# Patient Record
Sex: MALE | Race: ASIAN | Employment: OTHER | URBAN - METROPOLITAN AREA
[De-identification: names, ages, dates, MRNs, and addresses within clinical notes are randomized per-mention and may not be internally consistent; named-entity substitution may affect disease eponyms.]

---

## 2022-10-14 ENCOUNTER — OFFICE VISIT (OUTPATIENT)
Dept: PRIMARY CARE CLINIC | Age: 72
End: 2022-10-14
Payer: MEDICAID

## 2022-10-14 VITALS
RESPIRATION RATE: 14 BRPM | SYSTOLIC BLOOD PRESSURE: 124 MMHG | OXYGEN SATURATION: 96 % | BODY MASS INDEX: 26.46 KG/M2 | HEIGHT: 64 IN | HEART RATE: 85 BPM | WEIGHT: 155 LBS | DIASTOLIC BLOOD PRESSURE: 86 MMHG

## 2022-10-14 DIAGNOSIS — F33.9 UNIPOLAR DEPRESSION (HCC): ICD-10-CM

## 2022-10-14 DIAGNOSIS — E11.69 HYPERLIPIDEMIA ASSOCIATED WITH TYPE 2 DIABETES MELLITUS (HCC): ICD-10-CM

## 2022-10-14 DIAGNOSIS — Z23 NEED FOR PNEUMOCOCCAL VACCINATION: ICD-10-CM

## 2022-10-14 DIAGNOSIS — G47.00 INSOMNIA, UNSPECIFIED TYPE: ICD-10-CM

## 2022-10-14 DIAGNOSIS — Z79.4 TYPE 2 DIABETES MELLITUS WITHOUT COMPLICATION, WITH LONG-TERM CURRENT USE OF INSULIN (HCC): ICD-10-CM

## 2022-10-14 DIAGNOSIS — Z23 NEEDS FLU SHOT: ICD-10-CM

## 2022-10-14 DIAGNOSIS — E78.5 HYPERLIPIDEMIA ASSOCIATED WITH TYPE 2 DIABETES MELLITUS (HCC): ICD-10-CM

## 2022-10-14 DIAGNOSIS — Z12.11 COLON CANCER SCREENING: ICD-10-CM

## 2022-10-14 DIAGNOSIS — E11.9 TYPE 2 DIABETES MELLITUS WITHOUT COMPLICATION, WITH LONG-TERM CURRENT USE OF INSULIN (HCC): ICD-10-CM

## 2022-10-14 DIAGNOSIS — Z76.89 ENCOUNTER TO ESTABLISH CARE: Primary | ICD-10-CM

## 2022-10-14 PROBLEM — F32.9 UNIPOLAR DEPRESSION: Status: ACTIVE | Noted: 2022-10-14

## 2022-10-14 LAB — HBA1C MFR BLD: 7 %

## 2022-10-14 PROCEDURE — 1123F ACP DISCUSS/DSCN MKR DOCD: CPT | Performed by: INTERNAL MEDICINE

## 2022-10-14 PROCEDURE — 99204 OFFICE O/P NEW MOD 45 MIN: CPT | Performed by: INTERNAL MEDICINE

## 2022-10-14 PROCEDURE — 90471 IMMUNIZATION ADMIN: CPT | Performed by: INTERNAL MEDICINE

## 2022-10-14 PROCEDURE — 83036 HEMOGLOBIN GLYCOSYLATED A1C: CPT | Performed by: INTERNAL MEDICINE

## 2022-10-14 PROCEDURE — 90694 VACC AIIV4 NO PRSRV 0.5ML IM: CPT | Performed by: INTERNAL MEDICINE

## 2022-10-14 PROCEDURE — 90472 IMMUNIZATION ADMIN EACH ADD: CPT | Performed by: INTERNAL MEDICINE

## 2022-10-14 PROCEDURE — 90677 PCV20 VACCINE IM: CPT | Performed by: INTERNAL MEDICINE

## 2022-10-14 PROCEDURE — 3051F HG A1C>EQUAL 7.0%<8.0%: CPT | Performed by: INTERNAL MEDICINE

## 2022-10-14 RX ORDER — SIMVASTATIN 20 MG
20 TABLET ORAL NIGHTLY
COMMUNITY

## 2022-10-14 RX ORDER — INSULIN ASPART 100 [IU]/ML
INJECTION, SOLUTION INTRAVENOUS; SUBCUTANEOUS
COMMUNITY
Start: 2022-10-08 | End: 2022-10-27 | Stop reason: SDUPTHER

## 2022-10-14 RX ORDER — TRAZODONE HYDROCHLORIDE 50 MG/1
50 TABLET ORAL NIGHTLY PRN
Qty: 30 TABLET | Refills: 5 | Status: SHIPPED | OUTPATIENT
Start: 2022-10-14

## 2022-10-14 RX ORDER — INSULIN GLARGINE 100 [IU]/ML
INJECTION, SOLUTION SUBCUTANEOUS NIGHTLY
COMMUNITY
End: 2022-10-21 | Stop reason: SDUPTHER

## 2022-10-14 SDOH — ECONOMIC STABILITY: FOOD INSECURITY: WITHIN THE PAST 12 MONTHS, THE FOOD YOU BOUGHT JUST DIDN'T LAST AND YOU DIDN'T HAVE MONEY TO GET MORE.: NEVER TRUE

## 2022-10-14 SDOH — ECONOMIC STABILITY: FOOD INSECURITY: WITHIN THE PAST 12 MONTHS, YOU WORRIED THAT YOUR FOOD WOULD RUN OUT BEFORE YOU GOT MONEY TO BUY MORE.: NEVER TRUE

## 2022-10-14 ASSESSMENT — PATIENT HEALTH QUESTIONNAIRE - PHQ9
SUM OF ALL RESPONSES TO PHQ QUESTIONS 1-9: 10
4. FEELING TIRED OR HAVING LITTLE ENERGY: 3
7. TROUBLE CONCENTRATING ON THINGS, SUCH AS READING THE NEWSPAPER OR WATCHING TELEVISION: 1
2. FEELING DOWN, DEPRESSED OR HOPELESS: 2
9. THOUGHTS THAT YOU WOULD BE BETTER OFF DEAD, OR OF HURTING YOURSELF: 0
3. TROUBLE FALLING OR STAYING ASLEEP: 3
5. POOR APPETITE OR OVEREATING: 0
SUM OF ALL RESPONSES TO PHQ9 QUESTIONS 1 & 2: 3
1. LITTLE INTEREST OR PLEASURE IN DOING THINGS: 1
6. FEELING BAD ABOUT YOURSELF - OR THAT YOU ARE A FAILURE OR HAVE LET YOURSELF OR YOUR FAMILY DOWN: 0
SUM OF ALL RESPONSES TO PHQ QUESTIONS 1-9: 10
10. IF YOU CHECKED OFF ANY PROBLEMS, HOW DIFFICULT HAVE THESE PROBLEMS MADE IT FOR YOU TO DO YOUR WORK, TAKE CARE OF THINGS AT HOME, OR GET ALONG WITH OTHER PEOPLE: 1
SUM OF ALL RESPONSES TO PHQ QUESTIONS 1-9: 10
SUM OF ALL RESPONSES TO PHQ QUESTIONS 1-9: 10
8. MOVING OR SPEAKING SO SLOWLY THAT OTHER PEOPLE COULD HAVE NOTICED. OR THE OPPOSITE, BEING SO FIGETY OR RESTLESS THAT YOU HAVE BEEN MOVING AROUND A LOT MORE THAN USUAL: 0

## 2022-10-14 ASSESSMENT — ENCOUNTER SYMPTOMS
CONSTIPATION: 0
COUGH: 0
SINUS PRESSURE: 0
SORE THROAT: 0
NAUSEA: 0
TROUBLE SWALLOWING: 0
VOMITING: 0
BACK PAIN: 0
WHEEZING: 0
EYE REDNESS: 0
COLOR CHANGE: 0
ABDOMINAL PAIN: 0
SHORTNESS OF BREATH: 0
CHEST TIGHTNESS: 0
BLOOD IN STOOL: 0
EYE PAIN: 0
DIARRHEA: 0
ABDOMINAL DISTENTION: 0

## 2022-10-14 ASSESSMENT — SOCIAL DETERMINANTS OF HEALTH (SDOH): HOW HARD IS IT FOR YOU TO PAY FOR THE VERY BASICS LIKE FOOD, HOUSING, MEDICAL CARE, AND HEATING?: NOT HARD AT ALL

## 2022-10-14 NOTE — ASSESSMENT & PLAN NOTE
A1C:   Lab Results   Component Value Date/Time    LABA1C 7.0 10/14/2022 08:57 AM     Stable- continue home blood sugar monitoring  Counselled on Diet and exercise with goal to achieve appropriate BMI  Continue Diabetic medication as documented in medication list Lantus, aspart, metformin 1000 mg bid  Continue home foot care  Patient agreed with plan with verbal understanding

## 2022-10-14 NOTE — ASSESSMENT & PLAN NOTE
Stable and controlled  Continue medication as documented in your medication list  Simvastatin 20 mg nightly

## 2022-10-14 NOTE — PROGRESS NOTES
Heber Saucedo (:  1950) is a 81 y.o. male,New patient, here for evaluation of the following chief complaint(s):  Diabetes      SUBJECTIVE/OBJECTIVE:  HPI    This is a 81 y.o. male patient who comes in for establishment of care. The patient has a past medical history of -    1. T2 DM - Patient's blood sugars have been stable. Patient denies complaints or symptoms today no polyuria or polydipsia. Patient mentions adherence with treatment. Patient eats a low-carb diet. Patient denies chest pain or shortness of breath with exertion or at rest.       2. HLD - Patient denies complaints or symptoms today. Patient mentions he is adherent with treatment. 3. Depression/Insomnia - Patient complaining of the same. Patient recently moved to [place] is feeling a little bit sad. Patient says that his prior doctor prescribed trazodone which helped with both insomnia and improved his  mood. Requesting prescription of the same. Health Maintenance    Annual retinal eye exam - Patient due  will need to schedule   Annual foot exam - will get in future  Annual Dentist visit - Patient due  will need to schedule   Tobacco smoking - NO  Alcohol Misuse - NO  Illicit Drug Use-  NO  Healthy Diet and physical activity -  YES  Obesity Screen- screened 10/14/22  Wears seat belt- YES  End of life directives discussed with patient. -Mentions he does not have  a will/or/an advanced directives. Was instructed to start process looking into preparing his will an advanced directives.    The ASCVD Risk score (Holly GREENE, et al., 2019) failed to calculate for the following reasons:    Cannot find a previous HDL lab    Cannot find a previous total cholesterol lab     Health Maintenance Due   Topic Date Due    COVID-19 Vaccine (1) Never done    Lipids  Never done    Depression Monitoring  Never done    Hepatitis C screen  Never done    DTaP/Tdap/Td vaccine (1 - Tdap) Never done    Colorectal Cancer Screen  Never done    Shingles vaccine (1 of 2) Never done    Pneumococcal 65+ years Vaccine (1 - PCV) Never done    Flu vaccine (1) Never done      Social History     Socioeconomic History    Marital status:      Spouse name: Not on file    Number of children: Not on file    Years of education: Not on file    Highest education level: Not on file   Occupational History    Not on file   Tobacco Use    Smoking status: Never    Smokeless tobacco: Never   Substance and Sexual Activity    Alcohol use: Not on file    Drug use: Not on file    Sexual activity: Not on file   Other Topics Concern    Not on file   Social History Narrative    Not on file     Social Determinants of Health     Financial Resource Strain: Low Risk     Difficulty of Paying Living Expenses: Not hard at all   Food Insecurity: No Food Insecurity    Worried About Running Out of Food in the Last Year: Never true    Sumeet of Food in the Last Year: Never true   Transportation Needs: Not on file   Physical Activity: Not on file   Stress: Not on file   Social Connections: Not on file   Intimate Partner Violence: Not on file   Housing Stability: Not on file      Past Medical History:   Diagnosis Date    Depression     Diabetes mellitus ([facility] [address]       No past surgical history on file. Current Outpatient Medications   Medication Sig Dispense Refill    Insulin Aspart FlexPen 100 UNIT/ML SOPN ADMINISTER 5 UNITS UNDER THE SKIN THREE TIMES DAILY BEFORE MEALS      traZODone (DESYREL) 50 MG tablet Take 1 tablet by mouth nightly as needed for Sleep 30 tablet 5    metFORMIN (GLUCOPHAGE) 1000 MG tablet Take 1,000 mg by mouth 2 times daily (with meals)      simvastatin (ZOCOR) 20 MG tablet Take 20 mg by mouth nightly      insulin glargine (LANTUS) 100 UNIT/ML injection vial Inject into the skin nightly       No current facility-administered medications for this visit.       No Known Allergies     Review of Systems   Constitutional:  Negative for activity change, appetite change, chills, fatigue, fever and unexpected weight change. HENT:  Negative for congestion, ear pain, postnasal drip, sinus pressure, sore throat, tinnitus and trouble swallowing. Eyes:  Negative for pain and redness. Respiratory:  Negative for cough, chest tightness, shortness of breath and wheezing. Cardiovascular:  Negative for chest pain, palpitations and leg swelling. Gastrointestinal:  Negative for abdominal distention, abdominal pain, blood in stool, constipation, diarrhea, nausea and vomiting. Endocrine: Negative for cold intolerance, heat intolerance and polydipsia. Genitourinary:  Negative for decreased urine volume, dysuria, flank pain, frequency, hematuria and urgency. Musculoskeletal:  Negative for arthralgias, back pain, joint swelling, neck pain and neck stiffness. Skin:  Negative for color change and rash. Neurological:  Negative for dizziness, weakness, numbness and headaches. Hematological:  Negative for adenopathy. Psychiatric/Behavioral:  Negative for behavioral problems, sleep disturbance and suicidal ideas. The patient is not nervous/anxious. /86 (Site: Left Upper Arm, Position: Sitting, Cuff Size: Medium Adult)   Pulse 85   Resp 14   Ht 5' 3.5\" (1.613 m)   Wt 155 lb (70.3 kg)   SpO2 96%   BMI 27.03 kg/m²    Physical Exam  Constitutional:       General: He is not in acute distress. Appearance: Normal appearance. He is not ill-appearing. HENT:      Head: Normocephalic and atraumatic. Right Ear: Tympanic membrane, ear canal and external ear normal. There is no impacted cerumen. Left Ear: Tympanic membrane, ear canal and external ear normal. There is no impacted cerumen. Mouth/Throat:      Mouth: Mucous membranes are moist.      Pharynx: No oropharyngeal exudate or posterior oropharyngeal erythema. Eyes:      Extraocular Movements: Extraocular movements intact.       Conjunctiva/sclera: Conjunctivae normal.      Pupils: Pupils are equal, round, and reactive to light. Neck:      Vascular: No carotid bruit. Cardiovascular:      Rate and Rhythm: Normal rate and regular rhythm. Pulses: Normal pulses. Heart sounds: Normal heart sounds. No murmur heard. No gallop. Pulmonary:      Effort: Pulmonary effort is normal.      Breath sounds: Normal breath sounds. No wheezing, rhonchi or rales. Abdominal:      General: Abdomen is flat. Bowel sounds are normal. There is no distension. Palpations: Abdomen is soft. Tenderness: There is no abdominal tenderness. There is no guarding or rebound. Musculoskeletal:         General: No swelling or tenderness. Normal range of motion. Cervical back: No tenderness. Lymphadenopathy:      Cervical: No cervical adenopathy. Skin:     Findings: No erythema, lesion or rash. Neurological:      General: No focal deficit present. Mental Status: He is alert and oriented to person, place, and time. Mental status is at baseline. Cranial Nerves: No cranial nerve deficit. Motor: No weakness. Psychiatric:         Mood and Affect: Mood normal.         Behavior: Behavior normal.         Thought Content: Thought content normal.         Judgment: Judgment normal.       ASSESSMENT/PLAN:  1. Encounter to establish care  Assessment & Plan:   Patient comes in to establish care. We discussed age appropriate USPSTF screens  Over 75% of the visit was spent counselling patient on appropriate lifestyle choices. See below for other diagnoses     2.  Type 2 diabetes mellitus without complication, with long-term current use of insulin ([facility])  Assessment & Plan:     A1C:   Lab Results   Component Value Date/Time    LABA1C 7.0 10/14/2022 08:57 AM     Stable- continue home blood sugar monitoring  Counselled on Diet and exercise with goal to achieve appropriate BMI  Continue Diabetic medication as documented in medication list Lantus, aspart, metformin 1000 mg bid  Continue home foot care  Patient agreed with plan with verbal understanding     Orders:  -     POCT glycosylated hemoglobin (Hb A1C)  3. Hyperlipidemia associated with type 2 diabetes mellitus ([facility] [address]  Assessment & Plan:  Stable and controlled  Continue medication as documented in your medication list  Simvastatin 20 mg nightly  4. Unipolar depression [facility])  Assessment & Plan: We will restart trazodone 50 mg at night  Orders:  -     traZODone (DESYREL) 50 MG tablet; Take 1 tablet by mouth nightly as needed for Sleep, Disp-30 tablet, R-5Normal  5. Insomnia, unspecified type  -     traZODone (DESYREL) 50 MG tablet; Take 1 tablet by mouth nightly as needed for Sleep, Disp-30 tablet, R-5Normal  6. Colon cancer screening  -     POCT Fecal Immunochemical Test (FIT); Future  7. Needs flu shot  -     Influenza, FLUAD, (age 72 y+), IM, PF, 0.5 mL  8. Need for pneumococcal vaccination  -     Pneumococcal, PCV20, PREVNAR 21, (age 25 yrs+), IM, PF      Preventative care discussed. Encouraged healthy diet choices, reg exercise. Patient agreed w/plan and verbal understanding. Patient advised to call or return with any concerns or problems or worsening conditions. Go to the ER for any severe or potentially life threatening problems. Return in about 5 days (around 10/19/2022). This note was generated in part or in whole with voice recognition software. Voice recognition is usually quite accurate but there are transcription errors that can often occur. All attempts were made to correct these errors I apologized for any  typographical errors that were not detected and corrected. Electronically signed by Amna Lopez MD on 10/14/2022 at 9:23 AM.

## 2022-10-21 ENCOUNTER — TELEPHONE (OUTPATIENT)
Dept: PRIMARY CARE CLINIC | Age: 72
End: 2022-10-21

## 2022-10-21 DIAGNOSIS — E11.9 TYPE 2 DIABETES MELLITUS WITHOUT COMPLICATION, WITH LONG-TERM CURRENT USE OF INSULIN (HCC): Primary | ICD-10-CM

## 2022-10-21 DIAGNOSIS — I10 BENIGN ESSENTIAL HTN: ICD-10-CM

## 2022-10-21 DIAGNOSIS — Z79.4 TYPE 2 DIABETES MELLITUS WITHOUT COMPLICATION, WITH LONG-TERM CURRENT USE OF INSULIN (HCC): Primary | ICD-10-CM

## 2022-10-21 RX ORDER — LISINOPRIL 5 MG/1
5 TABLET ORAL DAILY
Qty: 30 TABLET | Refills: 5 | Status: SHIPPED | OUTPATIENT
Start: 2022-10-21

## 2022-10-21 RX ORDER — INSULIN GLARGINE 100 [IU]/ML
25 INJECTION, SOLUTION SUBCUTANEOUS NIGHTLY
Qty: 10 ML | Refills: 5 | Status: SHIPPED | OUTPATIENT
Start: 2022-10-21 | End: 2022-10-26

## 2022-10-21 NOTE — TELEPHONE ENCOUNTER
Delfino called and said that his father Norma Loja is out of his insulin glargine 100 units and his lisinopril 5 mg. Please call to advise.

## 2022-10-26 ENCOUNTER — OFFICE VISIT (OUTPATIENT)
Dept: PRIMARY CARE CLINIC | Age: 72
End: 2022-10-26
Payer: MEDICAID

## 2022-10-26 VITALS
SYSTOLIC BLOOD PRESSURE: 113 MMHG | BODY MASS INDEX: 26.46 KG/M2 | RESPIRATION RATE: 16 BRPM | WEIGHT: 155 LBS | HEIGHT: 64 IN | DIASTOLIC BLOOD PRESSURE: 88 MMHG | OXYGEN SATURATION: 98 % | HEART RATE: 114 BPM

## 2022-10-26 DIAGNOSIS — Z72.89 OTHER PROBLEMS RELATED TO LIFESTYLE: ICD-10-CM

## 2022-10-26 DIAGNOSIS — Z79.4 TYPE 2 DIABETES MELLITUS WITHOUT COMPLICATION, WITH LONG-TERM CURRENT USE OF INSULIN (HCC): ICD-10-CM

## 2022-10-26 DIAGNOSIS — Z23 NEED FOR TDAP VACCINATION: ICD-10-CM

## 2022-10-26 DIAGNOSIS — E78.5 HYPERLIPIDEMIA ASSOCIATED WITH TYPE 2 DIABETES MELLITUS (HCC): ICD-10-CM

## 2022-10-26 DIAGNOSIS — Z23 NEED FOR SHINGLES VACCINE: ICD-10-CM

## 2022-10-26 DIAGNOSIS — R53.83 OTHER FATIGUE: ICD-10-CM

## 2022-10-26 DIAGNOSIS — Z12.5 SCREENING PSA (PROSTATE SPECIFIC ANTIGEN): ICD-10-CM

## 2022-10-26 DIAGNOSIS — F33.9 UNIPOLAR DEPRESSION (HCC): ICD-10-CM

## 2022-10-26 DIAGNOSIS — Z13.220 SCREENING FOR HYPERLIPIDEMIA: ICD-10-CM

## 2022-10-26 DIAGNOSIS — E55.9 VITAMIN D DEFICIENCY: ICD-10-CM

## 2022-10-26 DIAGNOSIS — Z00.00 ENCOUNTER FOR WELL ADULT EXAM WITHOUT ABNORMAL FINDINGS: Primary | ICD-10-CM

## 2022-10-26 DIAGNOSIS — E11.69 HYPERLIPIDEMIA ASSOCIATED WITH TYPE 2 DIABETES MELLITUS (HCC): ICD-10-CM

## 2022-10-26 DIAGNOSIS — E11.9 TYPE 2 DIABETES MELLITUS WITHOUT COMPLICATION, WITH LONG-TERM CURRENT USE OF INSULIN (HCC): ICD-10-CM

## 2022-10-26 DIAGNOSIS — Z11.59 NEED FOR HEPATITIS C SCREENING TEST: ICD-10-CM

## 2022-10-26 DIAGNOSIS — G47.00 INSOMNIA, UNSPECIFIED TYPE: ICD-10-CM

## 2022-10-26 DIAGNOSIS — Z00.00 ENCOUNTER FOR WELL ADULT EXAM WITHOUT ABNORMAL FINDINGS: ICD-10-CM

## 2022-10-26 DIAGNOSIS — E11.9 ENCOUNTER FOR DIABETIC FOOT EXAM (HCC): ICD-10-CM

## 2022-10-26 DIAGNOSIS — Z12.11 COLON CANCER SCREENING: ICD-10-CM

## 2022-10-26 LAB
A/G RATIO: 1.5 (ref 1.1–2.2)
ALBUMIN SERPL-MCNC: 4.3 G/DL (ref 3.4–5)
ALP BLD-CCNC: 92 U/L (ref 40–129)
ALT SERPL-CCNC: 22 U/L (ref 10–40)
ANION GAP SERPL CALCULATED.3IONS-SCNC: 13 MMOL/L (ref 3–16)
AST SERPL-CCNC: 20 U/L (ref 15–37)
BASOPHILS ABSOLUTE: 0 K/UL (ref 0–0.2)
BASOPHILS RELATIVE PERCENT: 0.6 %
BILIRUB SERPL-MCNC: 0.7 MG/DL (ref 0–1)
BUN BLDV-MCNC: 11 MG/DL (ref 7–20)
CALCIUM SERPL-MCNC: 9.3 MG/DL (ref 8.3–10.6)
CHLORIDE BLD-SCNC: 102 MMOL/L (ref 99–110)
CHOLESTEROL, TOTAL: 118 MG/DL (ref 0–199)
CO2: 24 MMOL/L (ref 21–32)
CREAT SERPL-MCNC: 0.8 MG/DL (ref 0.8–1.3)
EOSINOPHILS ABSOLUTE: 0.4 K/UL (ref 0–0.6)
EOSINOPHILS RELATIVE PERCENT: 5 %
GFR SERPL CREATININE-BSD FRML MDRD: >60 ML/MIN/{1.73_M2}
GLUCOSE BLD-MCNC: 168 MG/DL (ref 70–99)
HCT VFR BLD CALC: 48.2 % (ref 40.5–52.5)
HDLC SERPL-MCNC: 45 MG/DL (ref 40–60)
HEMOGLOBIN: 15.7 G/DL (ref 13.5–17.5)
HEPATITIS C ANTIBODY INTERPRETATION: NORMAL
LDL CHOLESTEROL CALCULATED: 53 MG/DL
LYMPHOCYTES ABSOLUTE: 1.4 K/UL (ref 1–5.1)
LYMPHOCYTES RELATIVE PERCENT: 18.6 %
MCH RBC QN AUTO: 28.5 PG (ref 26–34)
MCHC RBC AUTO-ENTMCNC: 32.6 G/DL (ref 31–36)
MCV RBC AUTO: 87.6 FL (ref 80–100)
MONOCYTES ABSOLUTE: 0.5 K/UL (ref 0–1.3)
MONOCYTES RELATIVE PERCENT: 6.6 %
NEUTROPHILS ABSOLUTE: 5.3 K/UL (ref 1.7–7.7)
NEUTROPHILS RELATIVE PERCENT: 69.2 %
PDW BLD-RTO: 14.2 % (ref 12.4–15.4)
PLATELET # BLD: 244 K/UL (ref 135–450)
PMV BLD AUTO: 8.2 FL (ref 5–10.5)
POTASSIUM SERPL-SCNC: 4.2 MMOL/L (ref 3.5–5.1)
PROSTATE SPECIFIC ANTIGEN: 1.53 NG/ML (ref 0–4)
RBC # BLD: 5.5 M/UL (ref 4.2–5.9)
SODIUM BLD-SCNC: 139 MMOL/L (ref 136–145)
TOTAL PROTEIN: 7.2 G/DL (ref 6.4–8.2)
TRIGL SERPL-MCNC: 100 MG/DL (ref 0–150)
TSH REFLEX: 1.32 UIU/ML (ref 0.27–4.2)
VITAMIN D 25-HYDROXY: 34.5 NG/ML
VLDLC SERPL CALC-MCNC: 20 MG/DL
WBC # BLD: 7.7 K/UL (ref 4–11)

## 2022-10-26 PROCEDURE — 90750 HZV VACC RECOMBINANT IM: CPT | Performed by: INTERNAL MEDICINE

## 2022-10-26 PROCEDURE — 90471 IMMUNIZATION ADMIN: CPT | Performed by: INTERNAL MEDICINE

## 2022-10-26 PROCEDURE — 99397 PER PM REEVAL EST PAT 65+ YR: CPT | Performed by: INTERNAL MEDICINE

## 2022-10-26 RX ORDER — INSULIN GLARGINE 100 [IU]/ML
25 INJECTION, SOLUTION SUBCUTANEOUS NIGHTLY
Qty: 5 ADJUSTABLE DOSE PRE-FILLED PEN SYRINGE | Refills: 5 | Status: SHIPPED | OUTPATIENT
Start: 2022-10-26

## 2022-10-26 ASSESSMENT — ENCOUNTER SYMPTOMS
VOMITING: 0
SINUS PRESSURE: 0
CONSTIPATION: 0
ABDOMINAL PAIN: 0
BLOOD IN STOOL: 0
EYE REDNESS: 0
TROUBLE SWALLOWING: 0
COLOR CHANGE: 0
BACK PAIN: 0
NAUSEA: 0
WHEEZING: 0
ABDOMINAL DISTENTION: 0
CHEST TIGHTNESS: 0
COUGH: 0
DIARRHEA: 0
SHORTNESS OF BREATH: 0
SORE THROAT: 0
EYE PAIN: 0

## 2022-10-26 NOTE — PROGRESS NOTES
Well Adult Note  Name: Rosalba Mak Date: 10/26/2022   MRN: [id number] Sex: Male   Age: 81 y.o. Ethnicity: Non- / Non    : 1950 Race:       Nikkie Garber is here for well adult exam.  History:  Wellness exam - Patient presents today for annual physical. Patient  reports feeling well. Patient has a normal appetite. Patient  eats 5+ servings of vegetables/fruits each day. Patient prepares food at home multiple times/day, eats in restaurants rarely. Patient  states that he sleeps well and gets 7-8 hours of sleep on average. In regards to emotional health, patient  denies depression or anxiety. Libido is considered to be normal. In regards to bowel habits, patient  has no complaints. Regarding urination, no complaints. Patient reports they feels safe at home, uses seat belts and has smoke detectors. Health Maintenance    Annual retinal eye exam - Patient due  will need to schedule   Annual foot exam - will get in future  Annual Dentist visit - Patient due  will need to schedule   Tobacco smoking - NO  Alcohol Misuse - NO  Illicit Drug Use-  NO  Healthy Diet and physical activity -  YES  Obesity Screen- screened 10/14/22  Wears seat belt- YES  End of life directives discussed with patient. -Mentions he does not have  a will/or/an advanced directives. Was instructed to start process looking into preparing his will an advanced directives. Review of Systems   Constitutional:  Negative for activity change, appetite change, chills, fatigue, fever and unexpected weight change. HENT:  Negative for congestion, ear pain, postnasal drip, sinus pressure, sore throat, tinnitus and trouble swallowing. Eyes:  Negative for pain and redness. Respiratory:  Negative for cough, chest tightness, shortness of breath and wheezing. Cardiovascular:  Negative for chest pain, palpitations and leg swelling.    Gastrointestinal:  Negative for abdominal distention, abdominal pain, blood in stool, constipation, diarrhea, nausea and vomiting. Endocrine: Negative for cold intolerance, heat intolerance and polydipsia. Genitourinary:  Negative for decreased urine volume, dysuria, flank pain, frequency, hematuria and urgency. Musculoskeletal:  Negative for arthralgias, back pain, joint swelling, neck pain and neck stiffness. Skin:  Negative for color change and rash. Neurological:  Negative for dizziness, weakness, numbness and headaches. Hematological:  Negative for adenopathy. Psychiatric/Behavioral:  Negative for behavioral problems, sleep disturbance and suicidal ideas. The patient is not nervous/anxious. No Known Allergies      Prior to Visit Medications    Medication Sig Taking? Authorizing Provider   insulin glargine (LANTUS SOLOSTAR) 100 UNIT/ML injection pen Inject 25 Units into the skin nightly Yes Jhoana Dolan MD   Insulin Pen Needle 32G X 4 MM MISC 1 each by Does not apply route daily Compatible with lantus Solostar pen Yes Jhoana Dolan MD   lisinopril (PRINIVIL;ZESTRIL) 5 MG tablet Take 1 tablet by mouth daily Yes Jhoana Dolan MD   Insulin Aspart FlexPen 100 UNIT/ML SOPN ADMINISTER 5 UNITS UNDER THE SKIN THREE TIMES DAILY BEFORE MEALS Yes Historical Provider, MD   traZODone (DESYREL) 50 MG tablet Take 1 tablet by mouth nightly as needed for Sleep Yes Jhoana Dolan MD   metFORMIN (GLUCOPHAGE) 1000 MG tablet Take 1,000 mg by mouth 2 times daily (with meals) Yes Historical Provider, MD   simvastatin (ZOCOR) 20 MG tablet Take 20 mg by mouth nightly Yes Historical Provider, MD         Past Medical History:   Diagnosis Date    Depression     Diabetes mellitus ([facility] [address]        No past surgical history on file. No family history on file.     Social History     Tobacco Use    Smoking status: Never    Smokeless tobacco: Never       Objective   /88 (Site: Left Upper Arm, Position: Sitting, Cuff Size: Medium Adult)   Pulse (!) 114   Resp 16   Ht 5' 3.5\" (1.613 m)   Wt 155 lb (70.3 kg) SpO2 98%   BMI 27.03 kg/m²   Wt Readings from Last 3 Encounters:   10/26/22 155 lb (70.3 kg)   10/14/22 155 lb (70.3 kg)     There were no vitals filed for this visit. Physical Exam  Constitutional:       General: He is not in acute distress. Appearance: Normal appearance. He is not ill-appearing. HENT:      Head: Normocephalic and atraumatic. Right Ear: Tympanic membrane, ear canal and external ear normal. There is no impacted cerumen. Left Ear: Tympanic membrane, ear canal and external ear normal. There is no impacted cerumen. Mouth/Throat:      Mouth: Mucous membranes are moist.      Pharynx: No oropharyngeal exudate or posterior oropharyngeal erythema. Eyes:      Extraocular Movements: Extraocular movements intact. Conjunctiva/sclera: Conjunctivae normal.      Pupils: Pupils are equal, round, and reactive to light. Neck:      Vascular: No carotid bruit. Cardiovascular:      Rate and Rhythm: Normal rate and regular rhythm. Pulses:           Dorsalis pedis pulses are detected w/ Doppler on the right side and detected w/ Doppler on the left side. Posterior tibial pulses are detected w/ Doppler on the right side and detected w/ Doppler on the left side. Heart sounds: Normal heart sounds. No murmur heard. No gallop. Pulmonary:      Effort: Pulmonary effort is normal.      Breath sounds: Normal breath sounds. No wheezing, rhonchi or rales. Abdominal:      General: Abdomen is flat. Bowel sounds are normal. There is no distension. Palpations: Abdomen is soft. Tenderness: There is no abdominal tenderness. There is no guarding or rebound. Musculoskeletal:         General: No swelling or tenderness. Normal range of motion. Cervical back: No tenderness. Feet:      Right foot:      Protective Sensation: 10 sites tested. 10 sites sensed. Left foot:      Protective Sensation: 10 sites tested. 10 sites sensed.       Comments: Diabetic foot exam - Vibratory sensation using 128 Hz tuning fork intact for bilateral - lateral ankle malleoli,  and bilateral 1st digit PIP joints. Lymphadenopathy:      Cervical: No cervical adenopathy. Skin:     Findings: No erythema, lesion or rash. Neurological:      General: No focal deficit present. Mental Status: He is alert and oriented to person, place, and time. Mental status is at baseline. Cranial Nerves: No cranial nerve deficit. Motor: No weakness. Psychiatric:         Mood and Affect: Mood normal.         Behavior: Behavior normal.         Thought Content: Thought content normal.         Judgment: Judgment normal.         Assessment   Plan   1. Encounter for well adult exam without abnormal findings  Assessment & Plan:   Patient comes in for wellness exam   we discussed age appropriate USPSTF screens  Over 75% of the visit was spent counselling patient on appropriate lifestyle choices. Orders:  -     Lipid Panel; Future  -     Hepatitis C Antibody; Future  -     CBC with Auto Differential; Future  -     Comprehensive Metabolic Panel; Future  -     Hemoglobin A1C; Future  -     Vitamin D 25 Hydroxy; Future  -     TSH with Reflex; Future  -     PSA Screening; Future  -     Microalbumin / Creatinine Urine Ratio; Future  2. Type 2 diabetes mellitus without complication, with long-term current use of insulin ([facility])  Assessment & Plan:     A1C:   Lab Results   Component Value Date/Time    LABA1C 7.0 10/14/2022 08:57 AM     Stable- continue home blood sugar monitoring  Counselled on Diet and exercise with goal to achieve appropriate BMI  Continue Diabetic medication as documented in medication list Lantus 25 units nightly, aspart, metformin 1000 mg bid  Continue home foot care  Patient agreed with plan with verbal understanding     Orders:  -     CBC with Auto Differential; Future  -     Comprehensive Metabolic Panel;  Future  -     Hemoglobin A1C; Future  -     Microalbumin / Creatinine Urine Ratio; Future  -     insulin glargine (LANTUS SOLOSTAR) 100 UNIT/ML injection pen; Inject 25 Units into the skin nightly, Disp-5 Adjustable Dose Pre-filled Pen Syringe, R-5Normal  -     Insulin Pen Needle 32G X 4 MM MISC; DAILY Starting Wed 10/26/2022, Disp-100 each, R-4, NormalCompatible with lantus Solostar pen  3. Hyperlipidemia associated with type 2 diabetes mellitus ([facility]  Assessment & Plan:  Stable and controlled  Continue medication as documented in your medication list  Simvastatin 20 mg nightly  4. Unipolar depression [facility])  Assessment & Plan: We will restart trazodone 50 mg at night  5. Insomnia, unspecified type  Assessment & Plan:   Stable and controlled  Continue medication as documented in your medication list  Trazodone 50 mg nightly  6. Encounter for diabetic foot exam ([facility]  Assessment & Plan:   Performed today  Orders:  -      DIABETES FOOT EXAM  7. Need for Tdap vaccination  -     TSH with Reflex; Future  8. Need for shingles vaccine  -     Zoster, SHINGRIX, (50 yrs +), IM  9. Screening for hyperlipidemia  -     Lipid Panel; Future  10. Other problems related to lifestyle  -     Hepatitis C Antibody; Future  11. Need for hepatitis C screening test  -     Hepatitis C Antibody; Future  12. Other fatigue  13. Vitamin D deficiency  -     Vitamin D 25 Hydroxy; Future  14. Screening PSA (prostate specific antigen)  -     PSA Screening; Future  15. Colon cancer screening  -     POCT Fecal Immunochemical Test (FIT); Future       Well adult exam  -  Anticipatory Guidance  Injury Prevention  Lap-shoulder belts, Smoke detectors, Carbon monoxide detectors, Safe storage and handling of firearms; removal if appropriate and  Occupational risk counseling  Substance Abuse  - Tobacco/alcohol/drug cessation or never starting any of those. Include pharmacotherapy, social support for cessation if applicable to patient, and skills training/problem solving. Availability of treatment for abuse.     Sexual Behavior  - STD prevention; abstinence; avoid high-risk behavior; condoms/female barrier with spermicide,  Contraception   Diet and Exercise   - Limit fat and cholesterol; maintain caloric balance; emphasized grains, fruits and vegetables. Moderation in sodium/caffeine intake, saturated fat and cholesterol, caloric balance, sufficient intake of fresh fruits, vegetables, fiber, calcium. Regular physical activity at least 150 minutes per week to maintain activity. Stressed the importance of regular exercise. Protection from UV Light: Discussed using Hats and sun block when exposed to direct sunlight. To schedule general skin exam with Dermatology. Abuse and Violence: violence prevention at home, school and in social situations  Dental Health: Discussed importance of regular tooth brushing, flossing, and dental visits.   Immunizations reviewed : Discussed with patient      Personalized Preventive Plan   Current Health Maintenance Status  Immunization History   Administered Date(s) Administered    Influenza, FLUAD, (age 72 y+), Adjuvanted, 0.5mL 10/14/2022    Pneumococcal conjugate PCV20, PF (Prevnar 20) 10/14/2022    Zoster Recombinant (Shingrix) 10/26/2022        Health Maintenance   Topic Date Due    COVID-19 Vaccine (1) Never done    Lipids  Never done    Diabetic microalbuminuria test  Never done    Diabetic retinal exam  Never done    Hepatitis C screen  Never done    DTaP/Tdap/Td vaccine (1 - Tdap) Never done    Colorectal Cancer Screen  Never done    Shingles vaccine (2 of 2) 12/21/2022    A1C test (Diabetic or Prediabetic)  10/14/2023    Depression Monitoring  10/14/2023    Diabetic foot exam  10/26/2023    Flu vaccine  Completed    Pneumococcal 65+ years Vaccine  Completed    Hepatitis A vaccine  Aged Out    Hib vaccine  Aged Out    Meningococcal (ACWY) vaccine  Aged Out     Recommendations for [place] Due: see orders and patient instructions/AVS.    Return in about 6 months (around 4/26/2023). Advance Care Planning   Advanced Care Planning: Discussed the patients choices for care and treatment in case of a health event that adversely affects decision-making abilities. Also discussed the patients long-term treatment options. Reviewed with the patient the appropriate state-specific advance directive documents. Reviewed the process of designating a competent adult as an Agent (or -in-fact) that could take make health care decisions for the patient if incompetent. Patient was asked to complete the declaration forms, either acknowledge the forms by a public notary or an eligible witness and provide a signed copy to the practice office.   Time spent (minutes): 5 mins

## 2022-10-26 NOTE — ASSESSMENT & PLAN NOTE
A1C:   Lab Results   Component Value Date/Time    LABA1C 7.0 10/14/2022 08:57 AM     Stable- continue home blood sugar monitoring  Counselled on Diet and exercise with goal to achieve appropriate BMI  Continue Diabetic medication as documented in medication list Lantus 25 units nightly, aspart, metformin 1000 mg bid  Continue home foot care  Patient agreed with plan with verbal understanding

## 2022-10-26 NOTE — PATIENT INSTRUCTIONS
Advance Directives: Care Instructions  Overview  An advance directive is a legal way to state your wishes at the end of your life. It tells your family and your doctor what to do if you can't say what you want. There are two main types of advance directives. You can change them any time your wishes change. Living will. This form tells your family and your doctor your wishes about life support and other treatment. The form is also called a declaration. Medical power of . This form lets you name a person to make treatment decisions for you when you can't speak for yourself. This person is called a health care agent (health care proxy, health care surrogate). The form is also called a durable power of  for health care. If you do not have an advance directive, decisions about your medical care may be made by a family member, or by a doctor or a  who doesn't know you. It may help to think of an advance directive as a gift to the people who care for you. If you have one, they won't have to make tough decisions by themselves. Follow-up care is a key part of your treatment and safety. Be sure to make and go to all appointments, and call your doctor if you are having problems. It's also a good idea to know your test results and keep a list of the medicines you take. What should you include in an advance directive? Many states have a unique advance directive form. (It may ask you to address specific issues.) Or you might use a universal form that's approved by many states. If your form doesn't tell you what to address, it may be hard to know what to include in your advance directive. Use the questions below to help you get started. Who do you want to make decisions about your medical care if you are not able to? What life-support measures do you want if you have a serious illness that gets worse over time or can't be cured? What are you most afraid of that might happen?  (Maybe you're afraid of having pain, losing your independence, or being kept alive by machines.)  Where would you prefer to die? (Your home? A hospital? A nursing home?)  Do you want to donate your organs when you die? Do you want certain Catholic practices performed before you die? When should you call for help? Be sure to contact your doctor if you have any questions. Where can you learn more? Go to [email or web address]://[email or web address] and sign in to your JobSlot account. Enter R264 in the [employer] box to learn more about \"Advance Directives: Care Instructions. \"     If you do not have an account, please click on the \"Sign Up Now\" link. Current as of: June 16, 2022               Content Version: 13.4  © 8441-4718 [employer]. Care instructions adapted under license by [facility] [place] ([facility]). If you have questions about a medical condition or this instruction, always ask your healthcare professional. [place] [address] any warranty or liability for your use of this information. Learning About Medical Power of   What is a medical power of ? A medical power of , also called a durable power of  for health care, is one type of the legal forms called advance directives. It lets you name the person you want to make treatment decisions for you if you can't speak or decide for yourself. The person you choose is called your health care agent. This person is also called a health care proxy or health care surrogate. A medical power of  may be called something else in your state. How do you choose a health care agent? Choose your health care agent carefully. This person may or may not be a family member. Talk to the person before you make your final decision. Make sure he or she is comfortable with this responsibility. It's a good idea to choose someone who:  Is at least 25years old.   Knows you well and understands what makes life meaningful for you. Understands your Caodaism and moral values. Will do what you want, not what he or she wants. Will be able to make difficult choices at a stressful time. Will be able to refuse or stop treatment, if that is what you would want, even if you could die. Will be firm and confident with health professionals if needed. Will ask questions to get needed information. Lives near you or agrees to travel to you if needed. Your family may help you make medical decisions while you can still be part of that process. But it's important to choose one person to be your health care agent in case you aren't able to make decisions for yourself. If you don't fill out the legal form and name a health care agent, the decisions your family can make may be limited. A health care agent may be called something else in your state. Who will make decisions for you if you don't have a health care agent? If you don't have a health care agent or a living will, you may not get the care you want. Decisions may be made by family members who disagree about your medical care. Or decisions may be made by a medical professional who doesn't know you well. In some cases, a  makes the decisions. When you name a health care agent, it is very clear who has the power to make health decisions for you. How do you name a health care agent? You name your health care agent on a legal form. This form is usually called a medical power of . Ask your hospital, state bar association, or office on aging where to find these forms. You must sign the form to make it legal. Some states require you to get the form notarized. This means that a person called a  watches you sign the form and then he or she signs the form. Some states also require that two or more witnesses sign the form. Be sure to tell your family members and doctors who your health care agent is. Where can you learn more?   Go to [email or web address] and sign in to your Yotomo account. Enter [id number] in the [facility] box to learn more about \"Learning About [address]. \"     If you do not have an account, please click on the \"Sign Up Now\" link. Current as of: October 6, 2021               Content Version: 13.4  © 2006-2022 Healthwise, [employer]. Care instructions adapted under license by [facility] [place] ([facility]). If you have questions about a medical condition or this instruction, always ask your healthcare professional. [address] any warranty or liability for your use of this information. Learning About Morgan Jackson  What is a living will? A living will, also called a declaration, is a legal form. It tells your family and your doctor your wishes when you can't speak for yourself. It's used by the health professionals who will treat you as you near the end of your life or if you get seriously hurt or ill. If you put your wishes in writing, your loved ones and others will know what kind of care you want. They won't need to guess. This can ease your mind and be helpful to others. And you can change or cancel your living will at any time. A living will is not the same as an estate or property will. An estate will explains what you want to happen with your money and property after you die. How do you use it? Keep these facts in mind about how a living will is used. Your living will is used only if you can't speak or make decisions for yourself. Most often, one or more doctors must certify that you can't speak or decide for yourself before your living will takes effect. If you get better and can speak for yourself again, you can accept or refuse any treatment. It doesn't matter what you said in your living will. Some states may limit your right to refuse treatment in certain cases.  For example, you may need to clearly state in your living will that you don't want artificial hydration and nutrition, such as being fed through a tube. Is a living will a legal document? A living will is a legal document. Each state has its own laws about living robles. And a living will may be called something else in your state. Here are some things to know about living robles. You don't need an  to complete a living will. But legal advice can be helpful if your state's laws are unclear. It can also help if your health history is complicated or your family can't agree on what should be in your living will. You can change your living will at any time. Some people find that their wishes about end-of-life care change as their health changes. If you make big changes to your living will, complete a new form. If you move to another state, make sure that your living will is legal in the state where you now live. In most cases, doctors will respect your wishes even if you have a form from a different state. You might use a universal form that has been approved by many states. This kind of form can sometimes be filled out and stored online. Your digital copy will then be available wherever you have a connection to the internet. The doctors and nurses who need to treat you can find it right away. Your state may offer an online registry. This is another place where you can store your living will online. It's a good idea to get your living will notarized. This means using a person called a  to watch two people sign, or witness, your living will. What should you know when you create a living will? Here are some questions to ask yourself as you make your living will. Do you know enough about life support methods that might be used? If not, talk to your doctor so you know what might be done if you can't breathe on your own, your heart stops, or you can't swallow. What things would you still want to be able to do after you receive life-support methods?  Would you want to be able to walk? To speak? To eat on your own? To live without the help of machines? Do you want certain Protestant practices performed if you become very ill? If you have a choice, where do you want to be cared for? In your home? At a hospital or nursing home? If you have a choice at the end of your life, where would you prefer to die? At home? In a hospital or nursing home? Somewhere else? Would you prefer to be buried or cremated? Do you want your organs to be donated after you die? What should you do with your living will? Make sure that your family members and your health care agent have copies of your living will (also called a declaration). Give your doctor a copy of your living will. Ask to have it kept as part of your medical record. If you have more than one doctor, make sure that each one has a copy. Put a copy of your living will where it can be easily found. For example, some people may put a copy on their refrigerator door. If you are using a digital copy, be sure your doctor, family members, and health care agent know how to find and access it. Where can you learn more? Go to [email or web address]://[email or web address].[email or web address] org and sign in to your [employer] account. Enter H362 in the [employer] box to learn more about \"Learning About Living Zuleyka Regalado. \"     If you do not have an account, please click on the \"Sign Up Now\" link. Current as of: June 16, 2022               Content Version: 13.4  © 2006-2022 Healthwise, [employer]Bill. Care instructions adapted under license by [facility] [address] If you have questions about a medical condition or this instruction, always ask your healthcare professional. [place] [address] any warranty or liability for your use of this information. Learning About Changing a Habit by Setting Goals  How can you change a habit?      If you've decided to change a habit--whether it's quitting smoking, lowering your blood pressure, becoming more active, or doing something else to improve your health--congratulations! Making that decision is the first step toward making a change. What happens next? Have a reason. Set goals you can reach. Prepare for slip-ups. And get support. What's your reason? Your reason for wanting to change a habit is really important. Maybe you want to quit smoking so that you can avoid future health problems. Or maybe you want to eat a healthier diet so you can lose weight. If you have high blood pressure, your reason may be clear: to lower your blood pressure. Maybe you smoke and want to save money on cigarettes. You need to feel ready to make a change. If you don't feel ready now, that's okay. You can still be thinking and planning. When you truly want to make changes, you're ready for the next step. It's not easy to change habits--but you can do it. Taking the time to really think about what will motivate or inspire you will help you reach your goals. How do you set goals? Setting goals can help a lot when you're trying to make a healthy change. Focus on small goals. This will help you reach larger goals over time. With smaller goals, you'll have success more often, which will help you stay with it. For example, your large goal may be to lose 20 pounds. Your small goal could be to lose 5. Write down your goals. This will help you remember, and you'll have a clearer idea of what you want to achieve. Use a journal or notebook to record your goals. Hang up your plan where you will see it often as a reminder of what you're trying to do. Make your goals specific. Specific goals help you measure your progress. For example, setting a goal to eat one extra serving of vegetables a day is better than a general goal to \"eat more vegetables. \"  Focus on one goal at a time. By doing this, you're less likely to feel overwhelmed and then give up. When you reach a goal, reward yourself.   Celebrate your new behavior and success for several days, and then think about setting your next goal.  How can you prepare for slip-ups? It's perfectly normal to try to change a habit, go along fine for a while, and then have a setback. Lots of people try and try again before they reach their goals. What are the things that might cause a setback for you? If you have tried to change a habit before, think about what helped you and what got in your way. By thinking about these barriers now, you can plan ahead for how to deal with them if they happen. There will be times when you slip up and don't make your goal for the week. When that happens, don't get mad at yourself. Learn from the experience. Ask yourself what got in the way of reaching your goal. Positive thinking goes a long way when you're making lifestyle changes. How can you get support? Get a partner. It's motivating to know that someone is trying to make the same change that you're making, like being more active or changing your eating habits. You have someone who is counting on you to help them succeed. That person can also remind you how far you've come. Get friends and family involved. They can exercise with you. Or they can encourage you by saying how they admire what you are doing. Family members can join you in your healthy eating efforts. Don't be afraid to tell family and friends that their encouragement makes a big difference to you. Join a class or support group. People in these groups often have some of the same barriers you have. They can give you support when you don't feel like staying with your plan. They can boost your morale when you need a lift. James Wilks also find a number of online support groups. Encourage yourself. When you feel like giving up, don't waste energy feeling bad about yourself. Remember your reason for wanting to change, think about the progress you've made, and give yourself a pep talk and a pat on the back. Get professional help.  A dietitian can help you make your diet healthier while still allowing you to eat foods that you enjoy. A  or physical therapist can help design an exercise program that is fun and easy to stay on. A counselor, a , or your doctor can help you overcome hurdles, reduce stress, or quit smoking. Where can you learn more? Go to https://chpepicewcheikh.TCHO. org and sign in to your Predictivez account. Enter R004 in the [employer] box to learn more about \"Learning About Changing a Habit by Setting Goals. \"     If you do not have an account, please click on the \"Sign Up Now\" link. Current as of: February 9, 2022               Content Version: 13.4  © 2006-2022 [employer]. Care instructions adapted under license by [facility]. If you have questions about a medical condition or this instruction, always ask your healthcare professional. [place] [address] any warranty or liability for your use of this information. A Healthy Lifestyle: Care Instructions  A healthy lifestyle can help you feel good, have more energy, and stay at a weight that's healthy for you. You can share a healthy lifestyle with your friends and family. And you can do it on your own. Eat meals with your friends or family. You could try cooking together. Plan activities with other people. Go for a walk with a friend, try a free online fitness class, or join a sports league. Eat a variety of healthy foods. These include fruits, vegetables, whole grains, low-fat dairy, and lean protein. Choose healthy portions of food. You can use the Nutrition Facts label on food packages as a guide. Eat more fruits and vegetables. You could add vegetables to sandwiches or add fruit to cereal.  Drink water when you are thirsty. Limit soda, juice, and sports drinks. Try to exercise most days. Aim for at least 2½ hours of exercise each week. Keep moving. Work in the garden or take your dog on a walk.  Use the stairs instead of the elevator. If you use tobacco or nicotine, try to quit. Ask your doctor about programs and medicines to help you quit. Limit alcohol. Men should have no more than 2 drinks a day. Women should have no more than 1. For some people, no alcohol is the best choice. Follow-up care is a key part of your treatment and safety. Be sure to make and go to all appointments, and call your doctor if you are having problems. It's also a good idea to know your test results and keep a list of the medicines you take. Where can you learn more? Go to https://[email or web address].Huaneng [email or web address] org and sign in to your [employer] account. Enter G033 in the [employer] box to learn more about \"A Healthy Lifestyle: Care Instructions. \"     If you do not have an account, please click on the \"Sign Up Now\" link. Current as of: March 9, 2022               Content Version: 13.4  © 2006-2022 [employer]ax. Care instructions adapted under license by [facility] ([facility]). If you have questions about a medical condition or this instruction, always ask your healthcare professional. [place] [address] any warranty or liability for your use of this information. Heart-Healthy Diet   Sodium, Fat, and Cholesterol Controlled Diet       What Is a Heart Healthy Diet? A heart-healthy diet is one that limits sodium , certain types of fat , and cholesterol . This type of diet is recommended for:   People with any form of cardiovascular disease (eg, coronary heart disease , peripheral vascular disease , previous heart attack , previous stroke )   People with risk factors for cardiovascular disease, such as high blood pressure , high cholesterol , or diabetes   Anyone who wants to lower their risk of developing cardiovascular disease   Sodium    Sodium is a mineral found in many foods. In general, most people consume much more sodium than they need.  Diets high in sodium can increase blood pressure and lead to edema (water retention). On a heart-healthy diet, you should consume no more than 2,300 mg (milligrams) of sodium per dayabout the amount in one teaspoon of table salt. The foods highest in sodium include table salt (about 50% sodium), processed foods, convenience foods, and preserved foods. Cholesterol    Cholesterol is a fat-like, waxy substance in your blood. Our bodies make some cholesterol. It is also found in animal products, with the highest amounts in fatty meat, egg yolks, whole milk, cheese, shellfish, and organ meats. On a heart-healthy diet, you should limit your cholesterol intake to less than 200 mg per day. It is normal and important to have some cholesterol in your bloodstream. But too much cholesterol can cause plaque to build up within your arteries, which can eventually lead to a heart attack or stroke. The two types of cholesterol that are most commonly referred to are:   Low-density lipoprotein (LDL) cholesterol  Also known as bad cholesterol, this is the cholesterol that tends to build up along your arteries. Bad cholesterol levels are increased by eating fats that are saturated or hydrogenated. Optimal level of this cholesterol is less than 100. Over 130 starts to get risky for heart disease. High-density lipoprotein (HDL) cholesterol  Also known as good cholesterol, this type of cholesterol actually carries cholesterol away from your arteries and may, therefore, help lower your risk of having a heart attack. You want this level to be high (ideally greater than 60). It is a risk to have a level less than 40. You can raise this good cholesterol by eating olive oil, canola oil, avocados, or nuts. Exercise raises this level, too. Fat    Fat is calorie dense and packs a lot of calories into a small amount of food. Even though fats should be limited due to their high calorie content, not all fats are bad. In fact, some fats are quite healthful. Fat can be broken down into four main types.    The good-for-you fats are:   Monounsaturated fat  found in oils such as olive and canola, avocados, and nuts and natural nut butters; can decrease cholesterol levels, while keeping levels of HDL cholesterol high   Polyunsaturated fat  found in oils such as safflower, sunflower, soybean, corn, and sesame; can decrease total cholesterol and LDL cholesterol   Omega-3 fatty acids  particularly those found in fatty fish (such as salmon, trout, tuna, mackerel, herring, and sardines); can decrease risk of arrhythmias, decrease triglyceride levels, and slightly lower blood pressure   The fats that you want to limit are:   Saturated fat  found in animal products, many fast foods, and a few vegetables; increases total blood cholesterol, including LDL levels   Animal fats that are saturated include: butter, lard, whole-milk dairy products, meat fat, and poultry skin   Vegetable fats that are saturated include: hydrogenated shortening, palm oil, coconut oil, cocoa butter   Hydrogenated or trans fat  found in margarine and vegetable shortening, most shelf stable snack foods, and fried foods; increases LDL and decreases HDL     It is generally recommended that you limit your total fat for the day to less than 30% of your total calories. If you follow an 1800-calorie heart healthy diet, for example, this would mean 60 grams of fat or less per day. Saturated fat and trans fat in your diet raises your blood cholesterol the most, much more than dietary cholesterol does. For this reason, on a heart-healthy diet, less than 7% of your calories should come from saturated fat and ideally 0% from trans fat. On an 1800-calorie diet, this translates into less than 14 grams of saturated fat per day, leaving 46 grams of fat to come from mono- and polyunsaturated fats.    Food Choices on a Heart Healthy Diet   Food Category   Foods Recommended   Foods to Avoid   Grains   Breads and rolls without salted tops Most dry and cooked cereals Unsalted crackers and breadsticks Low-sodium or homemade breadcrumbs or stuffing All rice and pastas   Breads, rolls, and crackers with salted tops High-fat baked goods (eg, muffins, donuts, pastries) Quick breads, self-rising flour, and biscuit mixes Regular bread crumbs Instant hot cereals Commercially prepared rice, pasta, or stuffing mixes   Vegetables   Most fresh, frozen, and low-sodium canned vegetables Low-sodium and salt-free vegetable juices Canned vegetables if unsalted or rinsed   Regular canned vegetables and juices, including sauerkraut and pickled vegetables Frozen vegetables with sauces Commercially prepared potato and vegetable mixes   Fruits   Most fresh, frozen, and canned fruits All fruit juices   Fruits processed with salt or sodium   Milk   Nonfat or low-fat (1%) milk Nonfat or low-fat yogurt Cottage cheese, low-fat ricotta, cheeses labeled as low-fat and low-sodium   Whole milk Reduced-fat (2%) milk Malted and chocolate milk Full fat yogurt Most cheeses (unless low-fat and low salt) Buttermilk (no more than 1 cup per week)   Meats and Beans   Lean cuts of fresh or frozen beef, veal, lamb, or pork (look for the word loin) Fresh or frozen poultry without the skin Fresh or frozen fish and some shellfish Egg whites and egg substitutes (Limit whole eggs to three per week) Tofu Nuts or seeds (unsalted, dry-roasted), low-sodium peanut butter Dried peas, beans, and lentils   Any smoked, cured, salted, or canned meat, fish, or poultry (including hutson, chipped beef, cold cuts, hot dogs, sausages, sardines, and anchovies) Poultry skins Breaded and/or fried fish or meats Canned peas, beans, and lentils Salted nuts   Fats and Oils   Olive oil and canola oil Low-sodium, low-fat salad dressings and mayonnaise   Butter, margarine, coconut and palm oils, hutson fat   Snacks, Sweets, and Condiments   Low-sodium or unsalted versions of broths, soups, soy sauce, and condiments Pepper, herbs, and spices; vinegar, lemon, or lime juice Low-fat frozen desserts (yogurt, sherbet, fruit bars) Sugar, cocoa powder, honey, syrup, jam, and preserves Low-fat, trans-fat free cookies, cakes, and pies Enrique and animal crackers, fig bars, ernie snaps   High-fat desserts Broth, soups, gravies, and sauces, made from instant mixes or other high-sodium ingredients Salted snack foods Canned olives Meat tenderizers, seasoning salt, and most flavored vinegars   Beverages   Low-sodium carbonated beverages Tea and coffee in moderation Soy milk   Commercially softened water   Suggestions   Make whole grains, fruits, and vegetables the base of your diet. Choose heart-healthy fats such as canola, olive, and flaxseed oil, and foods high in heart-healthy fats, such as nuts, seeds, soybeans, tofu, and fish. Eat fish at least twice per week; the fish highest in omega-3 fatty acids and lowest in mercury include salmon, herring, mackerel, sardines, and canned chunk light tuna. If you eat fish less than twice per week or have high triglycerides, talk to your doctor about taking fish oil supplements. Read food labels. For products low in fat and cholesterol, look for fat free, low-fat, cholesterol free, saturated fat free, and trans fat freeAlso scan the Nutrition Facts Label, which lists saturated fat, trans fat, and cholesterol amounts. For products low in sodium, look for sodium free, very low sodium, low sodium, no added salt, and unsalted   Skip the salt when cooking or at the table; if food needs more flavor, get creative and try out different herbs and spices. Garlic and onion also add substantial flavor to foods. Trim any visible fat off meat and poultry before cooking, and drain the fat off after garcia. Use cooking methods that require little or no added fat, such as grilling, boiling, baking, poaching, broiling, roasting, steaming, stir-frying, and sauting. Avoid fast food and convenience food.  They tend to be high in saturated and trans fat and have a lot of added salt. Talk to a registered dietitian for individualized diet advice. Last Reviewed: March 2011 Sugey Nice MS, MPH, RD   Updated: 3/29/2011     WELL ADULT LIFESTYLE INSTRUCTIONS:    Janet Reap a day in the next week to spend an hour reviewing the information below then:     1) determine your health goals for the year   2) determine what changes you need to achieve those goals   3) design your daily routine, shopping habits etc to implement those changes        Default Right Action (no choices)       Make it EASY to do the RIGHT THINGS. 4) I invite you to send me your plans via 2can so I can continue to help you       with them    Examine your lifestyle with an emphasis on BARRIERS to bad and good habits and how you can design your life to make better choices. If you want to feel better these are the FUNDAMENTAL PILLARS of Wellness:    1)  You can choose to Get 150 min/week of moderate exercise (can talk but can't        sing) or 75 min/week of vigorous exercise (can't talk). This will enhance your sense of well being (Exercise is as good as medicine for   depression.)    2)  You can choose to Get 7-9 hours of sleep per night    Detoxifies your brain, reduces risk of dementia    3)  You can choose to Strength Train 2 x a week on non-consecutive days   This will improve function and reduce risk of injury. Body weight type exercises   such as Yoga and Pilates are excellent choices. 4)  You can choose Good Nutrition. Only eat your goal weight (in lbs) x 10        calories/day and get 5 servings of Vegetables/day   Plant based diets reduce risk of heart attack/stroke and will help you feel full on   less food. Avoid highly processed foods and processed carbohydrates. 5)  You can choose Moderate alcohol intake < 1-2 drinks/day   Alcohol will disrupt your sleep and add calories to your day. 6)  You can choose to Develop a Charismatic/Supportive relationship. This will strengthen your resilience for the ups and downs. 7)  You can choose to Practice Mindfulness. An hour a day of prayer/meditation/gratitude will change your life! If you are trying to lose weight, here are some recommendations for weight loss:  Not every weight loss program is appropriate for everybody. ..  good online sources include Noom (more social with daily check ins), Lifesum (similar but less social) and Naturally slim, as well as Brandneu ($1500)    The GI Diet or \"Primal diet\", Intermittent fasting can also be effective choices. If you have diabetes treated with insulin be sure to ask for specific guidance around meals. Take your desired weight in pounds and multiply by 10 and that is your average daily calorie allowance. For example if you wish to weigh 170 lb x 10 = 1700 ximena/day (this is how to gradually lose the weight and maintain your desired weight). Avoid soda/coke and all \"wet carbs\" => Drink ice water instead    Drink a large glass of ice water before meals and EAT SLOWLY (talk while you eat)! Rethink your hunger => it means your losing weight. Minimize highly processed carbohydrates as they stimulate your appetite:  Specifically cut back on Bread, Rice, Pasta and Potatoes    Avoid eating calories after 6 pm      Heart-Healthy Diet   Sodium, Fat, and Cholesterol Controlled Diet       What Is a Heart Healthy Diet? A heart-healthy diet is one that limits sodium , certain types of fat , and cholesterol . This type of diet is recommended for:   People with any form of cardiovascular disease (eg, coronary heart disease , peripheral vascular disease , previous heart attack , previous stroke )   People with risk factors for cardiovascular disease, such as high blood pressure , high cholesterol , or diabetes   Anyone who wants to lower their risk of developing cardiovascular disease   Sodium    Sodium is a mineral found in many foods.  In general, most people consume much more sodium than they need. Diets high in sodium can increase blood pressure and lead to edema (water retention). On a heart-healthy diet, you should consume no more than 2,300 mg (milligrams) of sodium per dayabout the amount in one teaspoon of table salt. The foods highest in sodium include table salt (about 50% sodium), processed foods, convenience foods, and preserved foods. Cholesterol    Cholesterol is a fat-like, waxy substance in your blood. Our bodies make some cholesterol. It is also found in animal products, with the highest amounts in fatty meat, egg yolks, whole milk, cheese, shellfish, and organ meats. On a heart-healthy diet, you should limit your cholesterol intake to less than 200 mg per day. It is normal and important to have some cholesterol in your bloodstream. But too much cholesterol can cause plaque to build up within your arteries, which can eventually lead to a heart attack or stroke. The two types of cholesterol that are most commonly referred to are:   Low-density lipoprotein (LDL) cholesterol  Also known as bad cholesterol, this is the cholesterol that tends to build up along your arteries. Bad cholesterol levels are increased by eating fats that are saturated or hydrogenated. Optimal level of this cholesterol is less than 100. Over 130 starts to get risky for heart disease. High-density lipoprotein (HDL) cholesterol  Also known as good cholesterol, this type of cholesterol actually carries cholesterol away from your arteries and may, therefore, help lower your risk of having a heart attack. You want this level to be high (ideally greater than 60). It is a risk to have a level less than 40. You can raise this good cholesterol by eating olive oil, canola oil, avocados, or nuts. Exercise raises this level, too. Fat    Fat is calorie dense and packs a lot of calories into a small amount of food.  Even though fats should be limited due to their high calorie content, not all fats are bad. In fact, some fats are quite healthful. Fat can be broken down into four main types. The good-for-you fats are:   Monounsaturated fat  found in oils such as olive and canola, avocados, and nuts and natural nut butters; can decrease cholesterol levels, while keeping levels of HDL cholesterol high   Polyunsaturated fat  found in oils such as safflower, sunflower, soybean, corn, and sesame; can decrease total cholesterol and LDL cholesterol   Omega-3 fatty acids  particularly those found in fatty fish (such as salmon, trout, tuna, mackerel, herring, and sardines); can decrease risk of arrhythmias, decrease triglyceride levels, and slightly lower blood pressure   The fats that you want to limit are:   Saturated fat  found in animal products, many fast foods, and a few vegetables; increases total blood cholesterol, including LDL levels   Animal fats that are saturated include: butter, lard, whole-milk dairy products, meat fat, and poultry skin   Vegetable fats that are saturated include: hydrogenated shortening, palm oil, coconut oil, cocoa butter   Hydrogenated or trans fat  found in margarine and vegetable shortening, most shelf stable snack foods, and fried foods; increases LDL and decreases HDL     It is generally recommended that you limit your total fat for the day to less than 30% of your total calories. If you follow an 1800-calorie heart healthy diet, for example, this would mean 60 grams of fat or less per day. Saturated fat and trans fat in your diet raises your blood cholesterol the most, much more than dietary cholesterol does. For this reason, on a heart-healthy diet, less than 7% of your calories should come from saturated fat and ideally 0% from trans fat. On an 1800-calorie diet, this translates into less than 14 grams of saturated fat per day, leaving 46 grams of fat to come from mono- and polyunsaturated fats.    Food Choices on a Heart Healthy Diet   Food Category   Foods Recommended Foods to Avoid   Grains   Breads and rolls without salted tops Most dry and cooked cereals Unsalted crackers and breadsticks Low-sodium or homemade breadcrumbs or stuffing All rice and pastas   Breads, rolls, and crackers with salted tops High-fat baked goods (eg, muffins, donuts, pastries) Quick breads, self-rising flour, and biscuit mixes Regular bread crumbs Instant hot cereals Commercially prepared rice, pasta, or stuffing mixes   Vegetables   Most fresh, frozen, and low-sodium canned vegetables Low-sodium and salt-free vegetable juices Canned vegetables if unsalted or rinsed   Regular canned vegetables and juices, including sauerkraut and pickled vegetables Frozen vegetables with sauces Commercially prepared potato and vegetable mixes   Fruits   Most fresh, frozen, and canned fruits All fruit juices   Fruits processed with salt or sodium   Milk   Nonfat or low-fat (1%) milk Nonfat or low-fat yogurt Cottage cheese, low-fat ricotta, cheeses labeled as low-fat and low-sodium   Whole milk Reduced-fat (2%) milk Malted and chocolate milk Full fat yogurt Most cheeses (unless low-fat and low salt) Buttermilk (no more than 1 cup per week)   Meats and Beans   Lean cuts of fresh or frozen beef, veal, lamb, or pork (look for the word loin) Fresh or frozen poultry without the skin Fresh or frozen fish and some shellfish Egg whites and egg substitutes (Limit whole eggs to three per week) Tofu Nuts or seeds (unsalted, dry-roasted), low-sodium peanut butter Dried peas, beans, and lentils   Any smoked, cured, salted, or canned meat, fish, or poultry (including hutson, chipped beef, cold cuts, hot dogs, sausages, sardines, and anchovies) Poultry skins Breaded and/or fried fish or meats Canned peas, beans, and lentils Salted nuts   Fats and Oils   Olive oil and canola oil Low-sodium, low-fat salad dressings and mayonnaise   Butter, margarine, coconut and palm oils, hutson fat   Snacks, Sweets, and Condiments   Low-sodium or unsalted versions of broths, soups, soy sauce, and condiments Pepper, herbs, and spices; vinegar, lemon, or lime juice Low-fat frozen desserts (yogurt, sherbet, fruit bars) Sugar, cocoa powder, honey, syrup, jam, and preserves Low-fat, trans-fat free cookies, cakes, and pies Enrique and animal crackers, fig bars, ernie snaps   High-fat desserts Broth, soups, gravies, and sauces, made from instant mixes or other high-sodium ingredients Salted snack foods Canned olives Meat tenderizers, seasoning salt, and most flavored vinegars   Beverages   Low-sodium carbonated beverages Tea and coffee in moderation Soy milk   Commercially softened water   Suggestions   Make whole grains, fruits, and vegetables the base of your diet. Choose heart-healthy fats such as canola, olive, and flaxseed oil, and foods high in heart-healthy fats, such as nuts, seeds, soybeans, tofu, and fish. Eat fish at least twice per week; the fish highest in omega-3 fatty acids and lowest in mercury include salmon, herring, mackerel, sardines, and canned chunk light tuna. If you eat fish less than twice per week or have high triglycerides, talk to your doctor about taking fish oil supplements. Read food labels. For products low in fat and cholesterol, look for fat free, low-fat, cholesterol free, saturated fat free, and trans fat freeAlso scan the Nutrition Facts Label, which lists saturated fat, trans fat, and cholesterol amounts. For products low in sodium, look for sodium free, very low sodium, low sodium, no added salt, and unsalted   Skip the salt when cooking or at the table; if food needs more flavor, get creative and try out different herbs and spices. Garlic and onion also add substantial flavor to foods. Trim any visible fat off meat and poultry before cooking, and drain the fat off after garcia.     Use cooking methods that require little or no added fat, such as grilling, boiling, baking, poaching, broiling, roasting, steaming, stir-frying, and sauting. Avoid fast food and convenience food. They tend to be high in saturated and trans fat and have a lot of added salt. Talk to a registered dietitian for individualized diet advice. Last Reviewed: March 2011 Silvia Soto MS, MPH, RD   Updated: 3/29/2011       Preventing Osteoporosis: After Your Visit  Your Care Instructions  Osteoporosis means the bones are weak and thin enough that they can break easily. The older you are, the more likely you are to get osteoporosis. But with plenty of calcium, vitamin D, and exercise, you can help prevent osteoporosis. The preteen and teen years are a key time for bone building. With the help of calcium, vitamin D, and exercise in those early years and beyond, the bones reach their peak density and strength by age 27. After age 27, your bones naturally start to thin and weaken. The stronger your bones are at around age 27, the lower your risk for osteoporosis. But no matter what your age and risk are, your bones still need calcium, vitamin D, and exercise to stay strong. Also avoid smoking, and limit alcohol. Smoking and heavy alcohol use can make your bones thinner. Talk to your doctor about any special risks you might have, such as having a close relative with osteoporosis or taking a medicine that can weaken bones. Your doctor can tell you the best ways to protect your bones from thinning. Follow-up care is a key part of your treatment and safety. Be sure to make and go to all appointments, and call your doctor if you are having problems. It's also a good idea to know your test results and keep a list of the medicines you take. How can you care for yourself at home? Get enough calcium and vitamin D. The [place] [employer] recommends adults younger than age 46 need 1,000 mg of calcium and 600 IU of vitamin D each day. Women ages 46 to 79 need 1,200 mg of calcium and 600 IU of vitamin D each day.  Men ages 46 to 79 need 1,000 mg of calcium and 600 IU of vitamin D each day. Adults 71 and older need 1,200 mg of calcium and 800 IU of vitamin D each day. Eat foods rich in calcium, like yogurt, cheese, milk, and dark green vegetables. Eat foods rich in vitamin D, like eggs, fatty fish, cereal, and fortified milk. Get some sunshine. Your body uses sunshine to make its own vitamin D. The safest time to be out in the sun is before 10 a.m. or after 3 p.m. Avoid getting sunburned. Sunburn can increase your risk of skin cancer. Talk to your doctor about taking a calcium plus vitamin D supplement. Ask about what type of calcium is right for you, and how much to take at a time. Adults ages 23 to 48 should not get more than 2,500 mg of calcium and 4,000 IU of vitamin D each day, whether it is from supplements and/or food. Adults ages 46 and older should not get more than 2,000 mg of calcium and 4,000 IU of vitamin D each day from supplements and/or food. Get regular bone-building exercise. Weight-bearing and resistance exercises keep bones healthy by working the muscles and bones against gravity. Start out at an exercise level that feels right for you. Add a little at a time until you can do the following:  Do 30 minutes of weight-bearing exercise on most days of the week. Walking, jogging, stair climbing, and dancing are good choices. Do resistance exercises with weights or elastic bands 2 to 3 days a week. Limit alcohol. Drink no more than 1 alcohol drink a day if you are a woman. Drink no more than 2 alcohol drinks a day if you are a man. Do not smoke. Smoking can make bones thin faster. If you need help quitting, talk to your doctor about stop-smoking programs and medicines. These can increase your chances of quitting for good. When should you call for help? Watch closely for changes in your health, and be sure to contact your doctor if:  You need help with a healthy eating plan.   You need help with an exercise plan    © 8380-9537 Healthwise, Incorporated. Care instructions adapted under license by [facility]. This care instruction is for use with your licensed healthcare professional. If you have questions about a medical condition or this instruction, always ask your healthcare professional. [address] any warranty or liability for your use of this information. Content Version: 9.4.54503; Last Revised: June 20, 2011                  DASH Diet: After Your Visit  Your Care Instructions  The DASH diet is an eating plan that can help lower your blood pressure. DASH stands for Dietary Approaches to Stop Hypertension. Hypertension is high blood pressure. The DASH diet focuses on eating foods that are high in calcium, potassium, and magnesium. These nutrients can lower blood pressure. The foods that are highest in these nutrients are fruits, vegetables, low-fat dairy products, nuts, seeds, and legumes. But taking calcium, potassium, and magnesium supplements instead of eating foods that are high in those nutrients does not have the same effect. The DASH diet also includes whole grains, fish, and poultry. The DASH diet is one of several lifestyle changes your doctor may recommend to lower your high blood pressure. Your doctor may also want you to decrease the amount of sodium in your diet. Lowering sodium while following the DASH diet can lower blood pressure even further than just the DASH diet alone. Follow-up care is a key part of your treatment and safety. Be sure to make and go to all appointments, and call your doctor if you are having problems. Its also a good idea to know your test results and keep a list of the medicines you take. How can you care for yourself at home? Following the DASH diet  Eat 4 to 5 servings of fruit each day. A serving is 1 medium-sized piece of fruit, ½ cup chopped or canned fruit, 1/4 cup dried fruit, or 4 ounces (½ cup) of fruit juice.  Choose fruit more often than fruit juice. Eat 4 to 5 servings of vegetables each day. A serving is 1 cup of lettuce or raw leafy vegetables, ½ cup of chopped or cooked vegetables, or 4 ounces (½ cup) of vegetable juice. Choose vegetables more often than vegetable juice. Get 2 to 3 servings of low-fat and fat-free dairy each day. A serving is 8 ounces of milk, 1 cup of yogurt, or 1 ½ ounces of cheese. Eat 7 to 8 servings of grains each day. A serving is 1 slice of bread, 1 ounce of dry cereal, or ½ cup of cooked rice, pasta, or cooked cereal. Try to choose whole-grain products as much as possible. Limit lean meat, poultry, and fish to 6 ounces each day. Six ounces is about the size of two decks of cards. Eat 4 to 5 servings of nuts, seeds, and legumes (cooked dried beans, lentils, and split peas) each week. A serving is 1/3 cup of nuts, 2 tablespoons of seeds, or ½ cup cooked dried beans or peas. Limit sweets and added sugars to 5 servings or less a week. A serving is 1 tablespoon jelly or jam, ½ cup sorbet, or 1 cup of lemonade. Tips for success  Start small. Do not try to make dramatic changes to your diet all at once. You might feel that you are missing out on your favorite foods and then be more likely to not follow the plan. Make small changes, and stick with them. Once those changes become habit, add a few more changes. Try some of the following:  Make it a goal to eat a fruit or vegetable at every meal and at snacks. This will make it easy to get the recommended amount of fruits and vegetables each day. Try yogurt topped with fruit and nuts for a snack or healthy dessert. Add lettuce, tomato, cucumber, and onion to sandwiches. Combine a ready-made pizza crust with low-fat mozzarella cheese and lots of vegetable toppings. Try using tomatoes, squash, spinach, broccoli, carrots, cauliflower, and onions. Have a variety of cut-up vegetables with a low-fat dip as an appetizer instead of chips and dip.   Sprinkle sunflower seeds or chopped almonds over salads. Or try adding chopped walnuts or almonds to cooked vegetables. Try some vegetarian meals using beans and peas. Add garbanzo or kidney beans to salads. Make burritos and tacos with mashed diaz beans or black beans    © 5395-1117 Healthwise, Incorporated. Care instructions adapted under license by [facility]. This care instruction is for use with your licensed healthcare professional. If you have questions about a medical condition or this instruction, always ask your healthcare professional. [place] [address] any warranty or liability for your use of this information. Content Version: 9.4.48327; Last Revised: March 15, 2012                Alcohol Abuse and Alcoholism   (Alcohol Dependence; Alcohol Use Disorder)       Definition   Alcohol abuse is excessive or problematic alcohol consumption. It can progress to alcoholism. Alcoholism is chronic alcohol abuse that results in a physical dependence on alcohol (withdrawal symptoms) and an inability to stop or limit drinking. Causes   Several factors can contribute to alcohol abuse and alcoholism, including:   Genes   Brain chemicals that may be different than normal   Social pressure   Emotional stress   Pain   Depression and other mental health problems   Problem drinking behaviors learned from family or friends   Risk Factors   These factors increase your chance of developing alcoholism. Tell your doctor if you have any of these risk factors:   Sex: male   Family members who abuse alcohol (especially men whose fathers or brothers are alcoholic)   Starting to use alcohol at an early age (younger than 15)   Using illicit drugs or non-medical use of prescription drugs   Peer pressure   Easy access to alcoholic beverages   Psychiatric disorders, such as depression or anxiety   Smoking   Symptoms   It is common to deny an alcohol problem. Alcohol abuse can occur without physical dependence.    Alcohol abuse symptoms include:   Repeated work, school, or home problems due to drinking   Risking physical safety   Recurring trouble with the law, often including drinking and driving   Continuing to drink despite alcohol-related difficulties   Symptoms of alcoholism include:   Craving a drink   Unable to stop or limit drinking   Needing greater amounts of alcohol to feel the same effect   Giving up activities in order to drink or recover from alcohol   Drinking that continues even when it causes or worsens health problems   Wanting to stop or reduce drinking, but not being able   Withdrawal symptoms if alcohol is stopped include:   Nausea   Sweating   Shaking   Anxiety   Increased blood pressure   Seizures ( delirium tremens [DTs])   The brain, nervous system, heart, liver, stomach, gastrointestinal tract, and pancreas can all be damaged by alcoholism. Some of the Organs Damaged in Alcohol Abuse        2011 [address] [place] [address].   Diagnosis   Doctors ask a series of questions to assess possible alcohol-related problems, including:   Have you tried to reduce your drinking? Have you felt bad about drinking? Have you been annoyed by another person's criticism of your drinking? Do you drink in the morning to steady your nerves or cure a hangover? Do you have problems with a job, your family, or the law? Do you drive under the influence of alcohol? Blood tests may be done to:   Look at the size of your red blood cells and to check for a substance called carbohydrate-deficient transferrin   Check for alcohol-related liver disease and other health problems   Treatment   Treatment for alcohol abuse or dependence is aimed at teaching patients how to manage the disease. Most professionals believe that this means giving up alcohol completely and permanently. The first and most important step is recognizing a problem exists. Successful treatment depends on your desire to change.  Your doctor can help you withdraw from alcohol safely. This could require hospitalization in a detoxification center. They will carefully monitor you for side effects. You may need medication while you are undergoing detoxification. Treatments include:   Medications    Drugs can help relieve some of the symptoms of withdrawal and help prevent relapse. The doctor may prescribe medication to reduce cravings for alcohol. Medications used to treat alcoholism and to try to prevent drinking include:   Naltrexone (ReVia, Vivitrol)blocks the high that makes you crave alcohol   Disulfiram (Antabuse)makes you very sick if you drink alcohol   Acamprosate (Campral)reduces your craving for alcohol   A study showed that an anticonvulsant drug, topiramate (Topamax), may reduce alcohol dependence. Education and Counseling    Therapy helps you to recognize alcohol's dangers. Education raises awareness of underlying issues and lifestyles that promote drinking. In therapy, you work to improve coping skills and learn other ways of dealing with stress or pain. Mentoring and Community Help    Alcoholics Anonymous (AA) helps many people to stop drinking and stay sober. Members meet regularly and support each other. Your family members may also benefit from attending meetings of Ralph. Living with an alcoholic can be a painful, stressful situation. Here are some general statistics on treatment outcomes of individuals one year after attempting to stop drinkin/3 remained abstinent   1/3 resumed drinking but at a lower level   1/3 relapsed completely   If you are diagnosed with alcohol abuse or alcoholism, follow your doctor's instructions . Prevention   Realizing that alcohol causes problems helps some people avoid it. Suggestions to decrease the risk of alcohol abuse and dependence include:   Socialize without alcohol. Avoid going to bars. Do not keep alcohol in your home. Avoid situations and people that encourage drinking.    Make new nondrinking friends. Do fun things that do not involve alcohol. Avoid reaching for a drink when stressed or upset. Limit your alcohol intake to a moderate level. Moderate is two or fewer drinks per day for men and one or fewer for women and older adults   A 12-ounce bottle of beer, a five-ounce glass of wine, or 1.5 ounces of liquor is considered one drink   If you are a parent, having a good relationship with your children may reduce their risk of alcohol abuse. Most professionals who treat alcohol abuse and dependence believe that complete abstinence is the only effective prevention. Last Reviewed: September 2010 Mariam Mcgovern MD, PhD, MPH   Updated: 9/20/2010     Patient information: Weight loss treatments    INTRODUCTION -- Obesity is a major international problem, and Americans are among the heaviest people in the world. The percentage of obese people in the [place] has risen steadily. Many people find that although they initially lose weight by dieting, they quickly regain the weight after the diet ends. Because it so hard to keep weight off over time, it is important to have as much information and support as possible before starting a diet. You are most likely to be successful in losing weight and keeping it off when you believe that your body weight can be controlled. STARTING A WEIGHT LOSS PROGRAM -- Some people like to talk to their doctor or nurse to get help choosing the best plan, monitoring progress, and getting advice and support along the way. To know what treatment (or combination of treatments) will work best, determine your body mass index (BMI) and waist circumference (measurement). The BMI is calculated from your height and weight.   A person with a BMI between 25 and 29.9 is considered overweight   A person with a BMI of 30 or greater is considered to be obese  A waist circumference greater than 35 inches (88 cm) in women and 40 inches (102 cm) in men increases the risk of obesity-related complications, such as heart disease and diabetes. People who are obese and who have a larger waist size may need more aggressive weight loss treatment than others. Talk to your doctor or nurse for advice. Types of treatment -- Based on your measurements and your medical history, your doctor or nurse can determine what combination of weight loss treatments would work best for you. Treatments may include changes in lifestyle, exercise, dieting, and, in some cases, weight loss medicines or weight loss surgery. Weight loss surgery, also called bariatric surgery, is reserved for people with severe obesity who have not responded to other weight loss treatments. SETTING A WEIGHT LOSS GOAL -- It is important to set a realistic weight loss goal. Your first goal should be to avoid gaining more weight and staying at your current weight (or within 5 percent). Many people have a \"dream\" weight that is difficult or impossible to achieve. People at high risk of developing diabetes who are able to lose 5 percent of their body weight and maintain this weight will reduce their risk of developing diabetes by about 50 percent and reduce their blood pressure. This is a success. Losing more than 15 percent of your body weight and staying at this weight is an extremely good result, even if you never reach your \"dream\" or \"ideal\" weight. LIFESTYLE CHANGES -- Programs that help you to change your lifestyle are usually run by psychologists or other professionals. The goals of lifestyle changes are to help you change your eating habits, become more active, and be more aware of how much you eat and exercise, helping you to make healthier choices. This type of treatment can be broken down into three steps: The triggers that make you want to eat   Eating   What happens after you eat  Triggers to eat -- Determining what triggers you to eat involves figuring out what foods you eat and where and when you eat.  To figure out what triggers you to eat, keep a record for a few days of everything you eat, the places where you eat, how often you eat, and the emotions you were feeling when you ate. For some people, the trigger is related to a certain time of day or night. For others, the trigger is related to a certain place, like sitting at a desk working. Eating -- You can change your eating habits by breaking the chain of events between the trigger for eating and eating itself. There are many ways to do this. For instance, you can:  Limit where you eat to a few places (eg, dining room)   Restrict the number of utensils (eg, only a fork) used for eating   Drink a sip of water between each bite   Chew your food a certain number of times   Get up and stop eating every few minutes  What happens after you eat -- Rewarding yourself for good eating behaviors can help you to develop better habits. This is not a reward for weight loss; instead, it is a reward for changing unhealthy behaviors. Do not use food as a reward. Some people find money, clothing, or personal care (eg, a hair cut, manicure, or massage) to be effective rewards. Treat yourself immediately after making better eating choices to reinforce the value of the good behavior. You need to have clear behavior goals, and you must have a time frame for reaching your goals. Reward small changes along the way to your final goal.  Other factors that contribute to successful weight loss -- Changing your behavior involves more than just changing unhealthy eating habits; it also involves finding people around you to support your weight loss, reducing stress, and learning to be strong when tempted by food. Establish a \"michelle\" system -- Having a friend or family member available to provide support and reinforce good behavior is very helpful. The support person needs to understand your goals.    Learn to be strong -- Learning to be strong when tempted by food is an important part of losing weight. As an example, you will need to learn how to say \"no\" and continue to say no when urged to eat at parties and social gatherings. Develop strategies for events before you go, such as eating before you go or taking low-calorie snacks and drinks with you. Develop a support system -- Having a support system is helpful when losing weight. This is why many commercial groups are successful. Family support is also essential; if your family does not support your efforts to lose weight, this can slow your progress or even keep you from losing weight. Positive thinking -- People often have conversations with themselves in their head; these conversations can be positive or negative. If you eat a piece of cake that was not planned, you may respond by thinking, \"Oh, you stupid idiot, you've blown your diet! \" and as a result, you may eat more cake. A positive thought for the same event could be, \"Well, I ate cake when it was not on my plan. Now I should do something to get back on track. \" A positive approach is much more likely to be successful than a negative one. Reduce stress -- Although stress is a part of everyday life, it can trigger uncontrolled eating in some people. It is important to find a way to get through these difficult times without eating or by eating low-calorie food, like raw vegetables. It may be helpful to imagine a relaxing place that allows you to temporarily escape from stress. With deep breaths and closed eyes, you can imagine this relaxing place for a few minutes. Self-help programs -- Self-help programs like [place] Watchers®, Overeaters Anonymous®, and Take Off Tanisha (TOPS)© work for some people. As with all weight loss programs, you are most likely to be successful with these plans if you make long-term changes in how you eat. CHOOSING A DIET -- A calorie is a unit of energy found in food. Your body needs calories to function.  The goal of any diet is to burn up more calories than you eat. How quickly you lose weight depends upon several factors, such as your age, gender, and starting weight. Older people have a slower metabolism than young people, so they lose weight more slowly. Men lose more weight than women of similar height and weight when dieting because they use more energy. People who are extremely overweight lose weight more quickly than those who are only mildly overweight. Try not to drink alcohol or drinks with added sugar, and most sweets (candy, cakes, cookies), since they rarely contain important nutrients. Portion-controlled diets -- One simple way to diet is to buy packaged foods, like frozen low-calorie meals or meal-replacement canned drinks. A typical meal plan for one day may include:  A meal-replacement drink or breakfast bar for breakfast   A meal-replacement drink or a frozen low-calorie (250 to 350 calories) meal for lunch   A frozen low-calorie meal or other prepackaged, calorie-controlled meal, along with extra vegetables for dinner  This would give you 1000 to 1500 calories per day. Low-fat diet -- To reduce the amount of fat in your diet, you can:  Eat low-fat foods. Low-fat foods are those that contain less than 30 percent of calories from fat. Fat is listed on the food facts label (figure 1). Count fat grams. For a 1500 calorie diet, this would mean about 45 g or fewer of fat per day. Low-carbohydrate diet -- Low- and very-low-carbohydrate diets (eg, Atkins diet, [employer]) have become popular ways to lose weight quickly. With a very-low-carbohydrate diet, you eat between 0 and 60 grams of carbohydrates per day (a standard diet contains 200 to 300 grams of carbohydrates)   With a low-carbohydrate diet, you eat between 60 and 130 grams of carbohydrates per day  Carbohydrates are found in fruits, vegetables, and grains (including breads, rice, pasta, and cereal), alcoholic beverages, and in dairy products.  Meat and fish do not contain carbohydrates. Side effects of very-low-carbohydrate diets can include constipation, headache, bad breath, muscle cramps, diarrhea, and weakness. Mediterranean diet -- The term \"Mediterranean diet\" refers to a way of eating that is common in [place]growing regions around the [facility]. Although there is some variation in Mediterranean diets, there are some similarities. Most Mediterranean diets include:  A high level of monounsaturated fats (from olive or canola oil, walnuts, pecans, almonds) and a low level of saturated fats (from butter)   A high amount of vegetables, fruits, legumes, and grains (7 to 10 servings of fruits and vegetables per day)   A moderate amount of milk and dairy products, mostly in the form of cheese. Use low-fat dairy products (skim milk, fat-free yogurt, low-fat cheese). A relatively low amount of red meat and meat products. Substitute fish or poultry for red meat. For those who drink alcohol, a modest amount (mainly as red wine) may help to protect against cardiovascular disease. A modest amount is up to one (4 ounce) glass per day for women and up to two glasses per day for men. Which diet is best? -- Studies have compared different diets, including:  Very-low-carbohydrate (Atkins)   Macronutrient balance controlling glycemic load (Zone®)   Reduced-calorie (Weight Watchers®)   Very-low-fat (Ornish)  No one diet is \"best\" for weight loss. Any diet will help you to lose weight if you stick with the diet. Therefore, it is important to choose a diet that includes foods you like. Fad diets -- Fad diets often promise quick weight loss (more than 1 to 2 pounds per week) and may claim that you do not need to exercise or give up favorite foods. Some fad diets cost a lot of money, because you have to pay for seminars or pills. Fad diets generally lack any scientific evidence that they are safe and effective, but instead rely on \"before\" and \"after\" photos or testimonials.   Diets that sound too good to be true usually are. These plans are a waste of time and money and are not recommended. A doctor, nurse, or nutritionist can help you find a safe and effective way to lose weight and keep it off. WEIGHT LOSS MEDICINES -- Taking a weight loss medicine may be helpful when used in combination with diet, exercise, and lifestyle changes. However, it is important to understand the risks and benefits of these medicines. It is also important to be realistic about your goal weight using a weight loss medicine; you may not reach your \"dream\" weight, but you may be able to reduce your risk of diabetes or heart disease. Weight loss medicines may be recommended for people who have not been able to lose weight with diet and exercise who have a:  BMI of 30 or more    BMI between 27 and 29.9 and have other medical problems, such as diabetes, high cholesterol, or high blood pressure  Two weight loss medicines are approved in the [place] for long-term use. These are sibutramine and orlistat. Other weight loss medicines (phentermine, diethylpropion) are available but are only approved for short-term use (up to 12 weeks). Sibutramine -- Sibutramine (Meridia®, Reductil®) is a medicine that reduces your appetite. In people who take the medicine for one year, the average weight loss is 10 percent of the initial body weight (5 percent more than those who took a placebo treatment). Side effects of sibutramine include insomnia, dry mouth, and constipation. Increases in blood pressure can occur. Therefore, blood pressure is usually monitored during treatment. There is no evidence that sibutramine causes heart or lung problems (like dexfenfluramine and fenfluramine (Phen/Fen)).  However, experts agree that sibutramine should not used by people with coronary heart disease, heart failure, uncontrolled hypertension, stroke, irregular heart rhythms, or peripheral vascular disease (poor circulation in the legs).  Orlistat -- Orlistat (Xenical® 120 mg capsules) is a medicine that reduces the amount of fat your body absorbs from the foods you eat. A lower-dose version is now available without a prescription (Wilfredo® 60 mg capsules) in many countries, including the [place]. The medicine is recommended three times per day, taken with a meal; you can skip a dose if you skip a meal or if the meal contains no fat. After one year of treatment with orlistat, the average weight loss is approximately 8 to 10 percent of initial body weight (4 percent more than in those who took a placebo). Cholesterol levels often improve, and blood pressure sometimes falls. In people with diabetes, orlistat may help control blood sugar levels. Side effects occur in 15 to 10 percent of people and may include stomach cramps, gas, diarrhea, leakage of stool, or oily stools. These problems are more likely when you take orlistat with a high-fat meal (if more than 30 percent of calories in the meal are from fat). Side effects usually improve as you learn to avoid high-fat foods. Severe liver injury has been reported rarely in patients taking orlistat, but it is not known if orlistat caused the liver problems. Diet supplements -- Diet supplements are widely used by people who are trying to lose weight, although the safety and efficacy of these supplements are often unproven. A few of the more common diet supplements are discussed below; none of these are recommended because they have not been studied carefully, and there is no proof they are safe or effective. Chitosan and wheat dextrin are ineffective for weight loss, and their use is not recommended. Ephedra, a compound related to ephedrine, is no longer available in the [place] due to safety concerns. Many nonprescription diet pills previously contained ephedra.  Although some studies have shown that ephedra helps with weight loss, there can be serious side effects (psychiatric symptoms, palpitations, and stomach upset), including death. There are not enough data about the safety and efficacy of chromium, ginseng, glucomannan, green tea, hydroxycitric acid, L carnitine, psyllium, pyruvate supplements, Emery wort, and conjugated linoleic acid. Two supplements from [facility], [address] [place] (also known as the Ailin Marquez 15 pill) and Herbathin dietary supplement, have been shown to contain prescription drugs. Hoodia gordonii is a dietary supplement derived from a plant in [place]. It is not recommended because there is no proof that it is safe or effective. Bitter orange (Citrus aurantium) can increase your heart rate and blood pressure and is not recommended. SHOULD I HAVE SURGERY TO LOSE WEIGHT? -- Weight loss surgery is recommended ONLY for people with one of the following:  Severe obesity (body mass index above 40) (calculator 1 and calculator 2) who have not responded to diet, exercise, or weight loss medicines   Body mass index between 35 and 40, along with a serious medical problem (including diabetes, severe joint pain, or sleep apnea) that would improve with weight loss  You should be sure that you understand the potential risks and benefits of weight loss surgery. You must be motivated and willing to make lifelong changes in how you eat to reach and maintain a healthier weight after surgery. You must also be realistic about weight loss after surgery (see 'Effectiveness of weight loss surgery' below). PREPARING FOR WEIGHT LOSS SURGERY -- Most people who have weight loss surgery will meet with several specialists before surgery is scheduled. This often includes a dietitian, mental health counselor, a doctor who specializes in care of obese people, and a surgeon who performs weight loss surgery (bariatric surgeon). You may need to work with these providers for several weeks or months before surgery.   The nutritionist will explain what and how much you will be able to eat after surgery. You may also need to lose a small amount of weight before surgery. The mental health specialist will help you to cope with stress and other factors that can make it harder to lose weight or trigger you to eat   The medical doctor will determine whether you need other tests, counseling, or treatment before surgery. He or she might also help you begin a medical weight loss program so that you can lose some weight before surgery. The bariatric surgeon will meet with you to discuss the surgeries available to treat obesity. He or she will also make sure you are a good candidate for surgery. TYPES OF WEIGHT LOSS SURGERY -- There are several types of weight loss surgeries, the most common being lap banding, gastric bypass, and gastric sleeve. Lap banding -- Laparoscopic adjustable gastric banding (LAGB), or lap banding, is a surgery that uses an adjustable band around the opening to the stomach (figure 1). This reduces the amount of food that you can eat at one time. Lap banding is done through small incisions, with a laparoscope. The band can be adjusted after surgery, allowing you to eat more or less food. Adjustments to the size and tightness of the band are made by using a needle to add or remove fluid from a port (a small container under the skin that is connected to the band). Adding fluid to the band makes it tighter which restricts the amount of food you can eat and may help you to lose more weight. Lap banding is a popular choice because it is relatively simple to perform, can be adjusted or removed, and has a low risk of serious complications immediately after surgery. However, weight loss with the lap band depends on your ability to follow the program closely. You will need to prepare nutritious meals that [facility] with\" the band, not against it. For example, the lap band will not work well if you eat or drink a large amount of liquid calories (like ice cream).  The band will not help you to feel full when you eat/drink liquid calories. Weight loss ranges from 45 to 75 percent after two years. As an example, a person who is 120 pounds overweight could expect to lose approximately 54 to 90 pounds in the two years after lap banding. Gastric bypass -- Will-en-Y gastric bypass, also called gastric bypass, helps you to lose weight by reducing the amount of food you can eat and reducing the number of calories and nutrients you absorb from the food you eat. To perform gastric bypass, a surgeon creates a small stomach pouch by dividing the stomach and attaching it to the small intestine. This helps you to lose weight in two ways: The smaller stomach can hold less food than before surgery. This causes you to feel full after eating a very small amount of food or liquid. Over time, the pouch might stretch, allowing you to eat more food. The body absorbs fewer calories, since food bypasses most of the stomach as well as the upper small intestine. This new arrangement seems to decrease your appetite and change how you break down foods by changing the release of various hormones. Gastric bypass can be performed as open surgery (through an incision on the abdomen) or laparoscopically, which uses smaller incisions and smaller instruments. Both the laparoscopic and open techniques have risks and benefits. You and your surgeon should work together to decide which surgery, if any, is right for you. Gastric bypass has a high success rate, and people lose an average of 62 to 68 percent of their excess body weight in the first year. Weight loss typically levels off after one to two years, with an overall excess weight loss between 50 and 75 percent. For a person who is 120 pounds overweight, an average of 60 to 90 pounds of weight loss would be expected. Gastric sleeve -- Gastric sleeve, also known as sleeve gastrectomy, is a surgery that reduces the size of the stomach and makes it into a narrow tube (figure 3). The new stomach is much smaller and produces less of the hormone (ghrelin) that causes hunger, helping you feel satisfied with less food. Sleeve gastrectomy is safer than gastric bypass because the intestines are not rearranged, and there is less chance of malnutrition. It also appears to control hunger better than lap banding. It might be safer than the lap banding because no foreign materials are used. The gastric sleeve has a good success rate, and people lose an average of 33 percent of their excess body weight in the first year. For a person who is 120 pounds overweight, this would mean losing about 40 pounds in the first year. WEIGHT LOSS SURGERY COMPLICATIONS -- A variety of complications can occur with weight loss surgery. The risks of surgery depend upon which surgery you have and any medical problems you had before surgery. Some of the more common early surgical complications (one to six weeks after surgery) include:  Bleeding   Infection   Blockage or tear in the bowels   Need for further surgery  Important medical complications after surgery can include blood clots in the legs or lungs, heart attack, pneumonia, and urinary tract infection. Complications are less likely when surgery is performed in centers that are experienced in weight loss surgery. In general, centers with experience in weight loss surgery have:  Board-certified doctors and surgeons   A team of support staff (dietitians, counselors, nurses)   Long-term follow-up after surgery   Hospital staff experienced with the care of weight loss patients. This includes nurses who are trained in the care of patients immediately after surgery and anesthesiologists who are experienced in caring for the morbidly obese. EFFECTIVENESS OF WEIGHT LOSS SURGERY -- The goal of weight loss surgery is to reduce the risk of illness or death associated with obesity.  Weight loss surgery can also help you to feel and look better, reduce the amount of money you spend on medicines, and cut down on sick days. As an example, weight loss surgery can improve health problems related to obesity (diabetes, high blood pressure, high cholesterol, sleep apnea) to the point that you need less or no medicine. Finally, weight loss surgery might reduce your risk of developing heart disease, cancer, and certain infections. AFTER WEIGHT LOSS SURGERY -- You will need to stay in the hospital until your team feels that it is safe for you to leave (on average, one to three days). Do not drive if you are taking prescription pain medicine. Begin exercising as soon as possible once you have healed; most weight loss centers will design an exercise program for you. Once you are home, it is important to eat and drink exactly what your doctor and dietitian recommend. You will see your doctor, nurse, and dietitian on a regular basis after surgery to monitor your health, diet, and weight loss. You will be able to slowly increase how much you eat over time, although it will always be important to:  Eat small, frequent meals and not skip meals   Chew your food slowly and completely   Avoid eating while \"distracted\" (such as eating while watching TV)   Stop eating when you feel full   Drink liquids at least 30 minutes before or after eating   Avoid foods high in fat or sugar   Take vitamin supplements, as recommended  It can take several months to learn to listen to your body so that you know when you are hungry and when you are full. You may dislike foods you previously loved, and you may begin to prefer new foods. This can be a frustrating process for some people, so talk to your dietitian if you are having trouble. It usually takes between one and two years to lose weight after surgery. After reaching their goal weight, some people have plastic surgery (called \"body contouring\") to remove excess skin from the body, particularly in the abdominal area.   Before you decide to have weight loss surgery, you must commit to staying healthy for life. This includes following up with your healthcare team, exercising most days of the week, and eating a sensible diet every day. It can be difficult to develop new eating and exercise habits after weight loss surgery, and you will have to work hard to stick to your goals. Recovering from surgery and losing weight can be stressful and emotional, and it is important to have the support of family and friends. Working with a , therapist, or support group can help you through the ups and downs. WHERE TO GET MORE INFORMATION -- Your healthcare provider is the best source of information for questions and concerns related to your medical problem. This article will be updated as needed every four months on our Web site ([email or web address])    High-Fiber Diet     What Is Fiber? Dietary fiber is a form of carbohydrate found in plants that cannot be digested by humans. All plants contain fiber, including fruits, vegetables, grains, and legumes. Fiber is often classified into two categories: soluble and insoluble. Soluble fiber draws water into the bowel and can help slow digestion. Examples of foods that are high in soluble fiber include oatmeal, oat bran, barley, legumes (eg, beans and peas), apples, and strawberries. Insoluble fiber speeds digestion and can add bulk to the stool. Examples of foods that are high in insoluble fiber include whole-wheat products, wheat bran, cauliflower, green beans, and potatoes. Why Follow a High-Fiber Diet? A high-fiber diet is often recommended to prevent and treat constipation , hemorrhoids , diverticulitis , and irritable bowel syndrome . Eating a high-fiber diet can also help improve your cholesterol levels, lower your risk of coronary heart disease , reduce your risk of type 2 diabetes , and lower your weight.  For people with type 1 or 2 diabetes, a high-fiber diet can also help stabilize blood sugar levels. How Much Fiber Should I Eat? A high-fiber diet should contain  20-35 grams  of fiber a day. This is actually the amount recommended for the general adult population; however, most Americans eat only 15 grams of fiber per day. Digestion of Fiber   Eating a higher fiber diet than usual can take some getting used to by your body's digestive system. To avoid the side effects of sudden increases in dietary fiber (eg, gas, cramping, bloating, and diarrhea), increase fiber gradually and be sure to drink plenty of fluids every day. Tips for Increasing Fiber Intake   Whenever possible, choose whole grains over refined grains (eg, brown rice instead of white rice, whole-wheat bread instead of white bread). Include a variety of grains in your diet, such as wheat, rye, barley, oats, quinoa, and bulgur. Eat more vegetarian-based meals. Here are some ideas: black bean burgers, eggplant lasagna, and veggie tofu stir-ayala. Choose high-fiber snacks, such as fruits, popcorn, whole-grain crackers, and nuts. Make whole-grain cereal or whole-grain toast part of your daily breakfast regime. When eating out, whether ordering a sandwich or dinner, ask for extra vegetables. When baking, replace part of the white flour with whole-wheat flour. Whole-wheat flour is particularly easy to incorporate into a recipe. High-Fiber Diet Eating Guide   Food Category   Foods Recommended   Notes   Grains   Whole-grain breads, muffins, bagels, or dionte bread Rye bread Whole-wheat crackers or crisp breads Whole-grain or bran cereals Oatmeal, oat bran, or grits Wheat germ Whole-wheat pasta and brown rice   Read the ingredients list on food labels. Look for products that list \"whole\" as the first ingredient (eg, whole-wheat, whole oats). Choose cereals with at least 2 grams of fiber per serving.    Vegetables   All vegetables, especially asparagus, bean sprouts, broccoli, [place] sprouts, cabbage, carrots, cauliflower, celery, corn, greens, green beans, green pepper, onions, peas, potatoes (with skin), snow peas, spinach, squash, sweet potatoes, tomatoes, zucchini   For maximum fiber intake, eat the peels of fruits and vegetablesjust be sure to wash them well first.   Fruits   All fruits, especially apples, berries, grapefruits, mangoes, nectarines, oranges, peaches, pears, dried fruits (figs, dates, prunes, raisins)   Choose raw fruits and vegetables over juice, cooked, or cannedraw fruit has more fiber. Dried fruit is also a good source of fiber. Milk   With the exception of yogurt containing inulin (a type of fiber), dairy foods provide little fiber. Add more fiber by topping your yogurt or cottage cheese with fresh fruit, whole grain or bran cereals, nuts, or seeds. Meats and Beans   All beans and peas, especially Garbanzo beans, kidney beans, lentils, lima beans, split peas, and diaz beans All nuts and seeds, especially almonds, peanuts, [place] nuts, cashews, peanut butter, walnuts, sesame and sunflower seeds All meat, poultry, fish, and eggs   Increase fiber in meat dishes by adding diaz beans, kidney beans, black-eyed peas, bran, or oatmeal. If you are following a low-fat diet, use nuts and seeds only in moderation. Fats and Oils   All in moderation   Fats and oils do not provide fiber   Snacks, Sweets, and Condiments   Fruit Nuts Popcorn, whole-wheat pretzels, or trail mix made with dried fruits, nuts, and seeds Cakes, breads, and cookies made with oatmeal or whole-wheat flour   Most snack foods do not provide much fiber. Choose snacks with at least 2 grams of fiber per serving. Last Reviewed: March 2011 Aminah White MS, MPH, RD   Updated: 3/29/2011       Safer Sex: After Your Visit  Your Care Instructions  Safer sex is a way to reduce your risk of getting an infection spread through sex. It can also help prevent pregnancy.  Most infections that are spread through sex, also called sexually transmitted infections or STIs, can be cured. But some can decrease your chances of getting pregnant if they are not treated early. Others, such as herpes, have no cure. And some, such as HIV, can be deadly. Several products can help you practice safer sex and reduce your chance of STIs. One of the best is a condom. There are condoms for men and for women. The female condom is a tube of soft plastic with a closed end that is placed deep into the vagina. You can use a special rubber sheet (dental dam) for protection during oral sex. Latex gloves can keep your hands from touching blood, semen, or other body fluids that can carry infections. Remember that birth control methods such as diaphragms, IUDs, foams, and birth control pills do not stop you from getting STIs. Follow-up care is a key part of your treatment and safety. Be sure to make and go to all appointments, and call your doctor if you are having problems. Its also a good idea to know your test results and keep a list of the medicines you take. How can you care for yourself at home? Think about getting shots to prevent hepatitis A and hepatitis B. These two diseases can be spread through sex. You also can get hepatitis A if you eat infected food. Use condoms or female condoms each time and every time you have sex. Learn the right way to use a male condom:  Condoms come in several sizes. Make sure you use the right size. A condom that is too small can break easily. A condom that is too big can slip off during sex. Use a new condom each time you have sex. Be careful not to poke a hole in the condom when you open the wrapper. Squeeze the tip of the condom to keep out air. Pull down the loose skin (foreskin) from the head of an uncircumcised penis. While squeezing the tip of the condom, unroll it all the way down to the base of the firm penis. Never use petroleum jelly (such as Vaseline), grease, hand lotion, baby oil, or anything with oil in it.  These products can make holes in the condom. After sex, hold the condom on your penis as you remove your penis from your partner. This will keep semen from spilling out of the condom. Learn to use a female condom:  You can put in a female condom up to 8 hours before sex. Squeeze the smaller ring at the closed end and insert it deep into the vagina. The larger ring at the open end should stay outside the vagina. During sex, make sure the penis goes into the condom. After the penis is removed, close the open end of the condom by twisting it. Remove the condom. Do not use a female condom and male condom at the same time. Do not have sex with anyone who has symptoms of an STI, such as sores on the genitals or mouth. The herpes virus that causes cold sores can spread to and from the penis and vagina. Do not drink a lot of alcohol or use drugs before sex. This can cause you to let down your guard and not practice safer sex. Having one sex partner (who does not have STIs and does not have sex with anyone else) is a sure way to avoid STIs. Talk to your partner before you have sex. Find out if he or she has or is at risk for any STI. Keep in mind that a person may be able to spread an STI even if he or she does not have symptoms. You and your partner may want to get an HIV test. You should get tested again 6 months later. © 8377-2960 [employer], [employer]. Care instructions adapted under license by [facility]. This care instruction is for use with your licensed healthcare professional. If you have questions about a medical condition or this instruction, always ask your healthcare professional. [place] [address] any warranty or liability for your use of this information. Content Version: 9.4.79570; Last Revised: January 19, 2012                Keep Your Memory Juju Hoyt       Many factors can affect your ability to remembera hectic lifestyle, aging, stress, chronic disease, and certain medicines. But, there are steps you can take to sharpen your mind and help preserve your memory. Challenge Your Brain   Regularly challenging your mind may help keeps it in top shape. Good mental exercises include:   Crossword puzzlesUse a dictionary if you need it; you will learn more that way. Brainteasers Try some! Crafts, such as wood working and sewing   Hobbies, such as gardening and building model airplanes   SocializingVisit old friends or join groups to meet new ones. Reading   Learning a new language   Taking a class, whether it be art history or oh chi   TravelingExperience the food, history, and culture of your destination   Learning to use a computer   Going to museums, the theater, or thought-provoking movies   Changing things in your daily life, such as reversing your pattern in the grocery store or brushing your teeth using your nondominant hand   Use Memory Aids   There is no need to remember every detail on your own. These memory aids can help:   Calendars and day planners   Electronic organizers to store all sorts of helpful informationThese devices can \"beep\" to remind you of appointments. A book of days to record birthdays, anniversaries, and other occasions that occur on the same date every year   Detailed \"to-do\" lists and strategically placed sticky notes   Quick \"study\" sessionsBefore a gathering, review who will be there so their names will be fresh in your mind. Establish routinesFor example, keep your keys, wallet, and umbrella in the same place all the time or take medicine with your 8:00 AM glass of juice   Live a Healthy Life   Many actions that will keep your body strong will do the same for your mind. For example:   Talk to Your Doctor About Herbs and Supplements    Malnutrition and vitamin deficiencies can impair your mental function. For example, vitamin B12 deficiency can cause a range of symptoms, including confusion. But, what if your nutritional needs are being met?  Can herbs and supplements still offer a benefit? Researchers have investigated a range of natural remedies, such as ginkgo , ginseng , and the supplement phosphatidylserine (PS). So far, though, the evidence is inconsistent as to whether these products can improve memory or thinking. If you are interested in taking herbs and supplements, talk to your doctor first because they may interact with other medicines that you are taking. Exercise Regularly    Among the many benefits of regular exercise are increased blood flow to the brain and decreased risk of certain diseases that can interfere with memory function. One study found that even moderate exercise has a beneficial effect. Examples of \"moderate\" exercise include:   Playing 18 holes of golf once a week, without a cart   Playing tennis twice a week   Walking one mile per day   Manage Stress    It can be tough to remember what is important when your mind is cluttered. Make time for relaxation. Choose activities that calm you down, and make it routine. Manage Chronic Conditions    Side effects of high blood pressure , diabetes, and heart disease can interfere with mental function. Many of the lifestyle steps discussed here can help manage these conditions. Strive to eat a healthy diet, exercise regularly, get stress under control, and follow your doctor's advice for your condition. Minimize Medications    Talk to your doctor about the medicines that you take. Some may be unnecessary. Also, healthy lifestyle habits may lower the need for certain drugs. Last Reviewed: April 2010 Africa Perdomo MD   Updated: 4/13/2010     Keeping Home a [address] [facility]       As we get older, changes in balance, gait, strength, vision, hearing, and cognition make even the most youthful senior more prone to accidents. Falls are one of the leading health risks for older people.  This increased risk of falling is related to:   Aging process (eg, decreased muscle strength, slowed reflexes) Higher incidence of chronic health problems (eg, arthritis, diabetes) that may limit mobility, agility or sensory awareness   Side effects of medicine (eg, dizziness, blurred vision)especially medicines like prescription pain medicines and drugs used to treat mental health conditions   Depending on the brittleness of your bones, the consequences of a fall can be serious and long lasting. Home Life   Research by the Association of Aging [facility]) shows that some home accidents among older adults can be prevented by making simple lifestyle changes and basic modifications and repairs to the home environment. Here are some lifestyle changes that experts recommend:   Have your hearing and vision checked regularly. Be sure to wear prescription glasses that are right for you. Speak to your doctor or pharmacist about the possible side effects of your medicines. A number of medicines can cause dizziness. If you have problems with sleep, talk to your doctor. Limit your intake of alcohol. If necessary, use a cane or walker to help maintain your balance. Wear supportive, rubber-soled shoes, even at home. If you live in a region that gets wintry weather, you may want to put special cleats on your shoes to prevent you from slipping on the snow and ice. Exercise regularly to help maintain muscle tone, agility, and balance. Always hold the banister when going up or down stairs. Also, use  bars when getting in or out of the bath or shower, or using the toilet. To avoid dizziness, get up slowly from a lying down position. Sit up first, dangling your legs for a minute or two before rising to a standing position. Overall Home Safety Check   According to the Consumer Product Safety Commision's \"Older Consumer Home Safety Checklist,\" it is important to check for potential hazards in each room. And remember, proper lighting is an essential factor in home safety. If you cannot see clearly, you are more likely to fall. Important questions to ask yourself include:   Are lamp, electric, extension, and telephone cords placed out of the flow of traffic and maintained in good condition? Have frayed cords been replaced? Are all small rugs and runners slip resistant? If not, you can secure them to the floor with a special double-sided carpet tape. Are smoke detectors properly locatedone on every floor of your home and one outside of every sleeping area? Are they in good working order? Are batteries replaced at least once a year? Do you have a well-maintained carbon monoxide detector outside every sleeping are in your home? Does your furniture layout leave plenty of space to maneuver between and around chairs, tables, beds, and sofas? Are hallways, stairs and passages between rooms well lit? Can you reach a lamp without getting out of bed? Are floor surfaces well maintained? Shag rugs, high-pile carpeting, tile floors, and polished wood floors can be particularly slippery. Stairs should always have handrails and be carpeted or fitted with a non-skid tread. Is your telephone easily reachable. Is the cord safely tucked away? Room by Room   According to the Association of Aging, bathrooms and loc are the two most potentially hazardous rooms in your home. In the Kitchen    Be sure your stove is in proper working order and always make sure burners and the oven are off before you go out or go to sleep. Keep pots on the back burners, turn handles away from the front of the stove, and keep stove clean and free of grease build-up. Kitchen ventilation systems and range exhausts should be working properly. Keep flammable objects such as towels and pot holders away from the cooking area except when in use. Make sure kitchen curtains are tied back. Move cords and appliances away from the sink and hot surfaces.  If extension cords are needed, install wiring guides so they do not hang over the sink, range, or working areas.    Look for coffee pots, kettles and toaster ovens with automatic shut-offs. Keep a mop handy in the kitchen so you can wipe up spills instantly. You should also have a small fire extinguisher. Arrange your kitchen with frequently used items on lower shelves to avoid the need to stand on a stepstool to reach them. Make sure countertops are well-lit to avoid injuries while cutting and preparing food. In the Bathroom    Use a non-slip mat or decals in the tub and shower, since wet, soapy tile or porcelain surfaces are extremely slippery. Make sure bathroom rugs are non-skid or tape them firmly to the floor. Bathtubs should have at least one, preferably two, grab bars, firmly attached to structural supports in the wall. (Do not use built-in soap holders or glass shower doors as grab bars.)    Tub seats fitted with non-slip material on the legs allow you to wash sitting down. For people with limited mobility, bathtub transfer benches allow you to slide safely into the tub. Raised toilet seats and toilet safety rails are helpful for those with knee or hip problems. In the [facility]    Make sure you use a nightlight and that the area around your bed is clear of potential obstacles. Be careful with electric blankets and never go to sleep with a heating pad, which can cause serious burns even if on a low setting. Use fire-resistant mattress covers and pillows, and NEVER smoke in bed. Keep a phone next to the bed that is programmed to dial 911 at the push of a button. If you have a chronic condition, you may want to sign on with an automatic call-in service. Typically the system includes a small pendant that connects directly to an emergency medical voice-response system. You should also make arrangements to stay in contact with someonefriend, neighbor, family memberon a regular schedule.    Fire Prevention   According to the [employer]. (Smoke Alarms for Every) [address] [place] [address], senior citizens are one of the two highest risk groups for death and serious injuries due to residential fires. When cooking, wear short-sleeved items, never a bulky long-sleeved robe. The [facility]'s Safety Checklist for Older Consumers emphasizes the importance of checking basements, garages, workshops and storage areas for fire hazards, such as volatile liquids, piles of old rags or clothing and overloaded circuits. Never smoke in bed or when lying down on a couch or recliner chair. Small portable electric or kerosene heaters are responsible for many home fires and should be used cautiously if at all. If you do use one, be sure to keep them away from flammable materials. In case of fire, make sure you have a pre-established emergency exit plan. Have a professional check your fireplace and other fuel-burning appliances yearly. Helping Hands   Baby boomers entering the balderrama years will continue to see the development of new products to help older adults live safely and independently in spite of age-related changes. Making Life More Livable  , by Alyssa Tabares, lists over 1,000 products for \"living well in the mature years,\" such as bathing and mobility aids, household security devices, ergonomically designed knives and peelers, and faucet valves and knobs for temperature control. Medical supply stores and organizations are good sources of information about products that improve your quality of life and insure your safety. Last Reviewed: November 2009 Ethan Gary MD   Updated: 3/7/2011            Advance Care Planning: Care Instructions  Your Care Instructions     It can be hard to live with an illness that cannot be cured. But if your health is getting worse, you may want to make decisions about end-of-life care. Planning for the end of your life does not mean that you are giving up. It is a way to make sure that your wishes are met.  Clearly stating your wishes can make it easier for your loved ones. Making plans while you are still able may also ease your mind and make your final days less stressful and more meaningful. Follow-up care is a key part of your treatment and safety. Be sure to make and go to all appointments, and call your doctor if you are having problems. It's also a good idea to know your test results and keep a list of the medicines you take. What can you do to plan for the end of life? You can bring these issues up with your doctor. You do not need to wait until your doctor starts the conversation. You might start with, \"What makes life worth living for me is. Noemi Beckham .\" And then follow it with, \"I would not be willing to live with . Noemi Beckham \" When you complete this sentence it helps your doctor understand your wishes. Talk openly and honestly with your doctor. This is the best way to understand the decisions you will need to make as your health changes. Know that you can always change your mind. Ask your doctor about commonly used life-support measures. These include tube feedings, breathing machines, and fluids given through a vein (IV). Understanding these treatments will help you decide whether you want them. You may choose to have these life-supporting treatments for a limited time. This allows a trial period to see whether they will help you. You may also decide that you want your doctor to take only certain measures to keep you alive. It may help to think about the big picture, like what makes life worth living for you or what your values and goals are. Talk to your doctor about how long you are likely to live. Your doctor may be able to give you an idea of what usually happens with your specific illness. Think about preparing papers that state your wishes. These papers are called advance directives. If you do this early and review them often, there will not be any confusion about what you want. You can change your instructions at any time.   Which papers should you prepare? Advance directives are legal papers that tell doctors how you want to be cared for at the end of your life. You do not need a  to write these papers. Ask your doctor or your state health department for information on how to write your advance directives. They may have the forms for each of these types of papers. Make sure your doctor has a copy of these on file, and give a copy to a family member or close friend. Consider a do-not-resuscitate order (DNR). This order asks that no extra treatments be done if your heart stops or you stop breathing. Extra treatments may include cardiopulmonary resuscitation (CPR), electrical shock to restart your heart, or a machine to breathe for you. If you decide to have a DNR order, ask your doctor to explain and write it. Place the order in your home where everyone can easily see it. Consider a living will. A living will explains your wishes about life support and other treatments at the end of your life if you become unable to speak for yourself. Living robles tell doctors to use or not use treatments that would keep you alive. You must have one or two witnesses or a notary present when you sign this form. A living will may be called something else in your state. Consider a medical power of . This form allows you to name a person to make decisions about your care if you are not able to. Most people ask a close friend or family member. Talk to this person about the kinds of treatments you want and those that you do not want. Make sure this person understands your wishes. A medical power of  may be called something else in your state. These legal papers are simple to change. Tell your doctor what you want to change, and ask him or her to make a note in your medical file. Give your family updated copies of the papers. Where can you learn more? Go to https://larry.[email or web address]. org and sign in to your [employer] account.  Enter P184 in the Search Health Information box to learn more about \"Advance Care Planning: Care Instructions. \"     If you do not have an account, please click on the \"Sign Up Now\" link. Current as of: October 6, 2021               Content Version: 13.4  © 2006-2022 Healthwise, Incorporated. Care instructions adapted under license by [facility] ([facility]). If you have questions about a medical condition or this instruction, always ask your healthcare professional. [address] any warranty or liability for your use of this information. Learning About Living Annmarie Heimlich  What is a living will? A living will, also called a declaration, is a legal form. It tells your family and your doctor your wishes when you can't speak for yourself. It's used by the health professionals who will treat you as you near the end of your life or if you get seriously hurt or ill. If you put your wishes in writing, your loved ones and others will know what kind of care you want. They won't need to guess. This can ease your mind and be helpful to others. And you can change or cancel your living will at any time. A living will is not the same as an estate or property will. An estate will explains what you want to happen with your money and property after you die. How do you use it? Keep these facts in mind about how a living will is used. Your living will is used only if you can't speak or make decisions for yourself. Most often, one or more doctors must certify that you can't speak or decide for yourself before your living will takes effect. If you get better and can speak for yourself again, you can accept or refuse any treatment. It doesn't matter what you said in your living will. Some states may limit your right to refuse treatment in certain cases. For example, you may need to clearly state in your living will that you don't want artificial hydration and nutrition, such as being fed through a tube.   Is a living will a legal document? A living will is a legal document. Each state has its own laws about living robles. And a living will may be called something else in your state. Here are some things to know about living robles. You don't need an  to complete a living will. But legal advice can be helpful if your state's laws are unclear. It can also help if your health history is complicated or your family can't agree on what should be in your living will. You can change your living will at any time. Some people find that their wishes about end-of-life care change as their health changes. If you make big changes to your living will, complete a new form. If you move to another state, make sure that your living will is legal in the state where you now live. In most cases, doctors will respect your wishes even if you have a form from a different state. You might use a universal form that has been approved by many states. This kind of form can sometimes be filled out and stored online. Your digital copy will then be available wherever you have a connection to the internet. The doctors and nurses who need to treat you can find it right away. Your state may offer an online registry. This is another place where you can store your living will online. It's a good idea to get your living will notarized. This means using a person called a  to watch two people sign, or witness, your living will. What should you know when you create a living will? Here are some questions to ask yourself as you make your living will. Do you know enough about life support methods that might be used? If not, talk to your doctor so you know what might be done if you can't breathe on your own, your heart stops, or you can't swallow. What things would you still want to be able to do after you receive life-support methods? Would you want to be able to walk? To speak? To eat on your own? To live without the help of machines?   Do you want certain Congregation practices performed if you become very ill? If you have a choice, where do you want to be cared for? In your home? At a hospital or nursing home? If you have a choice at the end of your life, where would you prefer to die? At home? In a hospital or nursing home? Somewhere else? Would you prefer to be buried or cremated? Do you want your organs to be donated after you die? What should you do with your living will? Make sure that your family members and your health care agent have copies of your living will (also called a declaration). Give your doctor a copy of your living will. Ask to have it kept as part of your medical record. If you have more than one doctor, make sure that each one has a copy. Put a copy of your living will where it can be easily found. For example, some people may put a copy on their refrigerator door. If you are using a digital copy, be sure your doctor, family members, and health care agent know how to find and access it. Where can you learn more? Go to https://[employer] Media[employer]Knowable[employer] Media.Apiphany. org and sign in to your [employer] account. Enter L418 in the [facility] box to learn more about \"Learning About Living Levonne Dopp. \"     If you do not have an account, please click on the \"Sign Up Now\" link. Current as of: June 16, 2022               Content Version: 13.4  © 2006-2022 Healthwise, Incorporated. Care instructions adapted under license by [facility] ([facility]). If you have questions about a medical condition or this instruction, always ask your healthcare professional. [place] [address] any warranty or liability for your use of this information. Learning About Medical Power of   What is a medical power of ? A medical power of , also called a durable power of  for health care, is one type of the legal forms called advance directives.  It lets you name the person you want to make treatment decisions for you if you can't speak or decide for yourself. The person you choose is called your health care agent. This person is also called a health care proxy or health care surrogate. A medical power of  may be called something else in your state. How do you choose a health care agent? Choose your health care agent carefully. This person may or may not be a family member. Talk to the person before you make your final decision. Make sure he or she is comfortable with this responsibility. It's a good idea to choose someone who:  Is at least 25years old. Knows you well and understands what makes life meaningful for you. Understands your Islam and moral values. Will do what you want, not what he or she wants. Will be able to make difficult choices at a stressful time. Will be able to refuse or stop treatment, if that is what you would want, even if you could die. Will be firm and confident with health professionals if needed. Will ask questions to get needed information. Lives near you or agrees to travel to you if needed. Your family may help you make medical decisions while you can still be part of that process. But it's important to choose one person to be your health care agent in case you aren't able to make decisions for yourself. If you don't fill out the legal form and name a health care agent, the decisions your family can make may be limited. A health care agent may be called something else in your state. Who will make decisions for you if you don't have a health care agent? If you don't have a health care agent or a living will, you may not get the care you want. Decisions may be made by family members who disagree about your medical care. Or decisions may be made by a medical professional who doesn't know you well. In some cases, a  makes the decisions. When you name a health care agent, it is very clear who has the power to make health decisions for you.   How do you name a health care agent? You name your health care agent on a legal form. This form is usually called a medical power of . Ask your hospital, state bar association, or office on aging where to find these forms. You must sign the form to make it legal. Some states require you to get the form notarized. This means that a person called a  watches you sign the form and then he or she signs the form. Some states also require that two or more witnesses sign the form. Be sure to tell your family members and doctors who your health care agent is. Where can you learn more? Go to https://chpepiceweb.[employer]Mist. org and sign in to your [employer] account. Enter [id number] in the [employer] [facility] box to learn more about \"Learning About [address]. \"     If you do not have an account, please click on the \"Sign Up Now\" link. Current as of: October 6, 2021               Content Version: 13.4  © 2006-2022 Healthwise, Incorporated. Care instructions adapted under license by [facility] ([facility]). If you have questions about a medical condition or this instruction, always ask your healthcare professional. [place] [address] any warranty or liability for your use of this information.

## 2022-10-27 ENCOUNTER — TELEPHONE (OUTPATIENT)
Dept: PRIMARY CARE CLINIC | Age: 72
End: 2022-10-27

## 2022-10-27 DIAGNOSIS — E11.9 TYPE 2 DIABETES MELLITUS WITHOUT COMPLICATION, WITH LONG-TERM CURRENT USE OF INSULIN (HCC): Primary | ICD-10-CM

## 2022-10-27 DIAGNOSIS — Z79.4 TYPE 2 DIABETES MELLITUS WITHOUT COMPLICATION, WITH LONG-TERM CURRENT USE OF INSULIN (HCC): Primary | ICD-10-CM

## 2022-10-27 LAB
ESTIMATED AVERAGE GLUCOSE: 162.8 MG/DL
HBA1C MFR BLD: 7.3 %

## 2022-10-27 RX ORDER — INSULIN ASPART 100 [IU]/ML
INJECTION, SOLUTION INTRAVENOUS; SUBCUTANEOUS
Qty: 5 ADJUSTABLE DOSE PRE-FILLED PEN SYRINGE | Refills: 5 | Status: SHIPPED | OUTPATIENT
Start: 2022-10-27

## 2022-10-27 RX ORDER — LANCETS 30 GAUGE
1 EACH MISCELLANEOUS DAILY
Qty: 300 EACH | Refills: 3 | Status: SHIPPED | OUTPATIENT
Start: 2022-10-27

## 2022-10-27 NOTE — TELEPHONE ENCOUNTER
Patient needs lancets sent in. I told him you could buy those at the pharmacy counter but he wants to use Medicaid to purchase them.

## 2022-10-27 NOTE — TELEPHONE ENCOUNTER
Patient's son called for a refill of the patient's Novalog 100 units (Insulin Aspart FlexPen 100 UNIT/ML SOPN). Patient is going [facility] this afternoon around 2pm and would like to  prescription. Patient forgot to tell Dr. Denise Gomez yesterday at his appointment.

## 2022-10-28 RX ORDER — INSULIN ASPART 100 [IU]/ML
INJECTION, SOLUTION INTRAVENOUS; SUBCUTANEOUS
Qty: 5 ADJUSTABLE DOSE PRE-FILLED PEN SYRINGE | Refills: 5 | OUTPATIENT
Start: 2022-10-28

## 2022-11-08 ENCOUNTER — NURSE ONLY (OUTPATIENT)
Dept: PRIMARY CARE CLINIC | Age: 72
End: 2022-11-08

## 2022-11-08 VITALS — DIASTOLIC BLOOD PRESSURE: 80 MMHG | SYSTOLIC BLOOD PRESSURE: 118 MMHG | HEART RATE: 95 BPM

## 2022-11-08 PROCEDURE — 2000F BLOOD PRESSURE MEASURE: CPT | Performed by: INTERNAL MEDICINE

## 2022-11-09 ENCOUNTER — TELEPHONE (OUTPATIENT)
Dept: PRIMARY CARE CLINIC | Age: 72
End: 2022-11-09

## 2022-11-09 DIAGNOSIS — E11.69 HYPERLIPIDEMIA ASSOCIATED WITH TYPE 2 DIABETES MELLITUS (HCC): ICD-10-CM

## 2022-11-09 DIAGNOSIS — E78.5 HYPERLIPIDEMIA ASSOCIATED WITH TYPE 2 DIABETES MELLITUS (HCC): ICD-10-CM

## 2022-11-09 DIAGNOSIS — Z79.4 TYPE 2 DIABETES MELLITUS WITHOUT COMPLICATION, WITH LONG-TERM CURRENT USE OF INSULIN (HCC): Primary | ICD-10-CM

## 2022-11-09 DIAGNOSIS — E11.9 TYPE 2 DIABETES MELLITUS WITHOUT COMPLICATION, WITH LONG-TERM CURRENT USE OF INSULIN (HCC): Primary | ICD-10-CM

## 2022-11-09 RX ORDER — SIMVASTATIN 20 MG
20 TABLET ORAL NIGHTLY
Qty: 30 TABLET | Refills: 5 | Status: SHIPPED | OUTPATIENT
Start: 2022-11-09

## 2022-11-09 NOTE — TELEPHONE ENCOUNTER
Patient needs a refill on his metFORMIN (GLUCOPHAGE) 1000 MG tablet and his simvastatin (ZOCOR) 20 MG tablet. He would like them sent to the [facility] on [address].

## 2022-11-11 ENCOUNTER — TELEPHONE (OUTPATIENT)
Dept: PRIMARY CARE CLINIC | Age: 72
End: 2022-11-11

## 2022-11-14 ENCOUNTER — TELEPHONE (OUTPATIENT)
Dept: PRIMARY CARE CLINIC | Age: 72
End: 2022-11-14

## 2022-11-14 DIAGNOSIS — F33.9 UNIPOLAR DEPRESSION (HCC): Primary | ICD-10-CM

## 2022-11-14 RX ORDER — ESCITALOPRAM OXALATE 5 MG/1
5 TABLET ORAL DAILY
Qty: 30 TABLET | Refills: 5 | Status: SHIPPED | OUTPATIENT
Start: 2022-11-14

## 2022-11-14 NOTE — TELEPHONE ENCOUNTER
I spoke with patients son Banner du justa, he states that the Trazodone has helped, but feels that his father is still depressed. He would like an Rx to try for this.

## 2022-11-15 NOTE — TELEPHONE ENCOUNTER
No worries if he doesn't have a cardiologist.   He doesn't particularly need one. Just wanted to make sure if he had we kept everyone in the loop. Please let patient and his son know that I sent Lexapro 5 mg daily to his pharmacy. This is a very low dose and we could increase it if needed. I have to make him aware that there is a very low risk/incidence of a drug interaction with trazodone causing serotonin syndrome. This occurs when serotonin levels become too high and it could cause flushing of the skin, tremor, hyperthermia, palpitations. The likelihood of this happening is very low but I have to inform patient of potential risk.

## 2022-11-25 PROBLEM — Z00.00 ENCOUNTER FOR WELL ADULT EXAM WITHOUT ABNORMAL FINDINGS: Status: RESOLVED | Noted: 2022-10-26 | Resolved: 2022-11-25

## 2022-12-02 ENCOUNTER — TELEPHONE (OUTPATIENT)
Dept: PRIMARY CARE CLINIC | Age: 72
End: 2022-12-02

## 2022-12-02 NOTE — TELEPHONE ENCOUNTER
Delfino called in regards to a form that needs to be filled out by Dr. Chito Fortune on the [facility] on Aging. I did inform him that Dr. Chito Fortune was out this week and will be back next week. Please call to advise.

## 2022-12-14 NOTE — TELEPHONE ENCOUNTER
Tika Phillips called from [facility] on Aging. She needs a form filled out by Dr. Kike Haile and faxed to [phone]. Form has been scann ed into media and placed in [employer].

## 2022-12-15 ENCOUNTER — TELEPHONE (OUTPATIENT)
Dept: PRIMARY CARE CLINIC | Age: 72
End: 2022-12-15

## 2022-12-15 NOTE — TELEPHONE ENCOUNTER
Delfino was checking to see if the paperwork for his dad on counseling on aging was filled out and faxed. Please call Delfino to confirm at [phone].

## 2022-12-15 NOTE — TELEPHONE ENCOUNTER
Delfino was checking to see if the paperwork for his dad on counseling on aging was filled out and faxed. Please call Delfino to confirm at [phone].

## 2023-01-03 ENCOUNTER — OFFICE VISIT (OUTPATIENT)
Dept: PRIMARY CARE CLINIC | Age: 73
End: 2023-01-03
Payer: MEDICAID

## 2023-01-03 VITALS
RESPIRATION RATE: 14 BRPM | DIASTOLIC BLOOD PRESSURE: 78 MMHG | SYSTOLIC BLOOD PRESSURE: 124 MMHG | HEART RATE: 105 BPM | HEIGHT: 64 IN | WEIGHT: 156 LBS | BODY MASS INDEX: 26.63 KG/M2 | OXYGEN SATURATION: 94 %

## 2023-01-03 DIAGNOSIS — G47.00 INSOMNIA, UNSPECIFIED TYPE: ICD-10-CM

## 2023-01-03 DIAGNOSIS — F33.9 UNIPOLAR DEPRESSION (HCC): ICD-10-CM

## 2023-01-03 DIAGNOSIS — Z79.4 TYPE 2 DIABETES MELLITUS WITHOUT COMPLICATION, WITH LONG-TERM CURRENT USE OF INSULIN (HCC): Primary | ICD-10-CM

## 2023-01-03 DIAGNOSIS — E11.9 TYPE 2 DIABETES MELLITUS WITHOUT COMPLICATION, WITH LONG-TERM CURRENT USE OF INSULIN (HCC): Primary | ICD-10-CM

## 2023-01-03 DIAGNOSIS — E78.5 HYPERLIPIDEMIA ASSOCIATED WITH TYPE 2 DIABETES MELLITUS (HCC): ICD-10-CM

## 2023-01-03 DIAGNOSIS — E11.69 HYPERLIPIDEMIA ASSOCIATED WITH TYPE 2 DIABETES MELLITUS (HCC): ICD-10-CM

## 2023-01-03 PROCEDURE — 1123F ACP DISCUSS/DSCN MKR DOCD: CPT | Performed by: INTERNAL MEDICINE

## 2023-01-03 PROCEDURE — 99214 OFFICE O/P EST MOD 30 MIN: CPT | Performed by: INTERNAL MEDICINE

## 2023-01-03 ASSESSMENT — ENCOUNTER SYMPTOMS
CHEST TIGHTNESS: 0
SHORTNESS OF BREATH: 0
EYE PAIN: 0
EYE REDNESS: 0
VOMITING: 0
WHEEZING: 0
COLOR CHANGE: 0
SINUS PRESSURE: 0
ABDOMINAL PAIN: 0
COUGH: 0
ABDOMINAL DISTENTION: 0
BLOOD IN STOOL: 0
BACK PAIN: 0
SORE THROAT: 0
CONSTIPATION: 0
DIARRHEA: 0
TROUBLE SWALLOWING: 0
NAUSEA: 0

## 2023-01-03 NOTE — ASSESSMENT & PLAN NOTE
A1C:   Lab Results   Component Value Date/Time    LABA1C 7.3 10/26/2022 09:19 AM    LABA1C 7.0 10/14/2022 08:57 AM     Stable- continue home blood sugar monitoring  Counselled on Diet and exercise with goal to achieve appropriate BMI  Continue Diabetic medication as documented in medication list Lantus 25 units nightly, aspart, metformin 1000 mg bid  Continue home foot care  Patient agreed with plan with verbal understanding

## 2023-01-03 NOTE — ASSESSMENT & PLAN NOTE
Stable and controlled  Continue medication as documented in your medication list  Trazodone 50 mg nightly

## 2023-01-03 NOTE — PROGRESS NOTES
Jaylen Resendiz (1950) is a 81 y.o. male   Diabetes     General health: This patient presents for check up and refills. The problem and medicine lists and chart were reviewed in detail. The patient has been worked up and treated for this/these condition/s and is compliant with taking the medication without any significant side effects. The patient's condition/s is/are chronic and unchanged and otherwise remains stable. Feels well with minor complaints. Main Problem Review -    1. T2 DM - Patient's blood sugars have been stable. Patient denies complaints or symptoms today no polyuria or polydipsia. Patient mentions adherence with treatment. Patient eats a low-carb diet. Patient denies chest pain or shortness of breath with exertion or at rest.       2. HLD - Patient denies complaints or symptoms today. Patient mentions he is adherent with treatment. 3. Depression/Insomnia - Patient complaining of the same. Patient recently moved to [place] is feeling a little bit sad. Patient says that his prior doctor prescribed trazodone which helped with both insomnia and improved his  mood. Requesting prescription of the same. Health Maintenance    Annual retinal eye exam - Patient due  will need to schedule   Annual foot exam - will get in future  Annual Dentist visit - Patient due  will need to schedule   Tobacco smoking - NO  Alcohol Misuse - NO  Illicit Drug Use-  NO  Healthy Diet and physical activity -  YES  Obesity Screen- screened 10/14/22  Wears seat belt- YES  End of life directives discussed with patient. -Mentions he does not have  a will/or/an advanced directives. Was instructed to start process looking into preparing his will an advanced directives. The ASCVD Risk score (Holly GREENE, et al., 2019) failed to calculate for the following reasons:     The valid total cholesterol range is 130 to 320 mg/dL     Review of Systems   Constitutional:  Negative for activity change, appetite change, chills, fatigue, fever and unexpected weight change. HENT:  Negative for congestion, ear pain, postnasal drip, sinus pressure, sore throat, tinnitus and trouble swallowing. Eyes:  Negative for pain and redness. Respiratory:  Negative for cough, chest tightness, shortness of breath and wheezing. Cardiovascular:  Negative for chest pain, palpitations and leg swelling. Gastrointestinal:  Negative for abdominal distention, abdominal pain, blood in stool, constipation, diarrhea, nausea and vomiting. Endocrine: Negative for cold intolerance, heat intolerance and polydipsia. Genitourinary:  Negative for decreased urine volume, dysuria, flank pain, frequency, hematuria and urgency. Musculoskeletal:  Negative for arthralgias, back pain, joint swelling, neck pain and neck stiffness. Skin:  Negative for color change and rash. Neurological:  Negative for dizziness, weakness, numbness and headaches. Hematological:  Negative for adenopathy. Psychiatric/Behavioral:  Negative for behavioral problems, sleep disturbance and suicidal ideas. The patient is not nervous/anxious. /78 (Site: Left Upper Arm, Position: Sitting, Cuff Size: Medium Adult)   Pulse (!) 105   Resp 14   Ht 5' 3.5\" (1.613 m)   Wt 156 lb (70.8 kg)   SpO2 94%   BMI 27.20 kg/m²    Physical Exam  Constitutional:       General: He is not in acute distress. Appearance: Normal appearance. He is not ill-appearing. HENT:      Head: Normocephalic and atraumatic. Right Ear: Ear canal normal.      Left Ear: Ear canal normal.      Mouth/Throat:      Mouth: Mucous membranes are moist.      Pharynx: No oropharyngeal exudate or posterior oropharyngeal erythema. Eyes:      Extraocular Movements: Extraocular movements intact. Conjunctiva/sclera: Conjunctivae normal.      Pupils: Pupils are equal, round, and reactive to light. Neck:      Vascular: No carotid bruit.    Cardiovascular:      Rate and Rhythm: Normal rate and regular rhythm. Pulses: Normal pulses. Heart sounds: Normal heart sounds. No murmur heard. No gallop. Pulmonary:      Effort: Pulmonary effort is normal.      Breath sounds: Normal breath sounds. No wheezing, rhonchi or rales. Abdominal:      General: Abdomen is flat. There is no distension. Musculoskeletal:         General: No swelling or tenderness. Normal range of motion. Cervical back: No tenderness. Lymphadenopathy:      Cervical: No cervical adenopathy. Skin:     Findings: No erythema, lesion or rash. Neurological:      General: No focal deficit present. Mental Status: He is alert and oriented to person, place, and time. Mental status is at baseline. Cranial Nerves: No cranial nerve deficit. Motor: No weakness. Psychiatric:         Mood and Affect: Mood normal.         Behavior: Behavior normal.         Thought Content: Thought content normal.         Judgment: Judgment normal.       ASSESSMENT/PLAN:  1. Type 2 diabetes mellitus without complication, with long-term current use of insulin ([facility])  Assessment & Plan:     A1C:   Lab Results   Component Value Date/Time    LABA1C 7.3 10/26/2022 09:19 AM    LABA1C 7.0 10/14/2022 08:57 AM     Stable- continue home blood sugar monitoring  Counselled on Diet and exercise with goal to achieve appropriate BMI  Continue Diabetic medication as documented in medication list Lantus 25 units nightly, aspart, metformin 1000 mg bid  Continue home foot care  Patient agreed with plan with verbal understanding     2. Hyperlipidemia associated with type 2 diabetes mellitus ([facility] [address]  Assessment & Plan:  Stable and controlled  Continue medication as documented in your medication list  Simvastatin 20 mg nightly  3. Unipolar depression ([facility] [address]  Assessment & Plan:  On Lexapro 5 mg daily takes trazodone 50 mg nightly for insomnia  4.  Insomnia, unspecified type  Assessment & Plan:   Stable and controlled  Continue medication as documented in your medication list  Trazodone 50 mg nightly      Preventative care discussed. Encouraged healthy diet choices, reg exercise. Patient agreed w/plan and verbal understanding. Patient advised to call or return with any concerns or problems or worsening conditions. Go to the ER for any severe or potentially life threatening problems. Return in about 4 months (around 4/26/2023) for Check up. This note was generated in part or in whole with voice recognition software. Voice recognition is usually quite accurate but there are transcription errors that can often occur. All attempts were made to correct these errors I apologized for any  typographical errors that were not detected and corrected. Electronically signed by Sherwin Laguna.  Irving Wilcox MD on 1/3/2023 at 3:22 PM.

## 2023-01-12 ENCOUNTER — OFFICE VISIT (OUTPATIENT)
Dept: PRIMARY CARE CLINIC | Age: 73
End: 2023-01-12
Payer: MEDICAID

## 2023-01-12 VITALS
DIASTOLIC BLOOD PRESSURE: 66 MMHG | SYSTOLIC BLOOD PRESSURE: 112 MMHG | WEIGHT: 156 LBS | RESPIRATION RATE: 14 BRPM | BODY MASS INDEX: 26.63 KG/M2 | HEART RATE: 112 BPM | OXYGEN SATURATION: 95 % | HEIGHT: 64 IN

## 2023-01-12 DIAGNOSIS — R19.7 DIARRHEA, UNSPECIFIED TYPE: ICD-10-CM

## 2023-01-12 DIAGNOSIS — L28.2 PRURITIC RASH: Primary | ICD-10-CM

## 2023-01-12 DIAGNOSIS — F33.9 UNIPOLAR DEPRESSION (HCC): ICD-10-CM

## 2023-01-12 DIAGNOSIS — E11.9 TYPE 2 DIABETES MELLITUS WITHOUT COMPLICATION, WITH LONG-TERM CURRENT USE OF INSULIN (HCC): ICD-10-CM

## 2023-01-12 DIAGNOSIS — Z79.4 TYPE 2 DIABETES MELLITUS WITHOUT COMPLICATION, WITH LONG-TERM CURRENT USE OF INSULIN (HCC): ICD-10-CM

## 2023-01-12 DIAGNOSIS — Z12.11 COLON CANCER SCREENING: ICD-10-CM

## 2023-01-12 PROCEDURE — 99214 OFFICE O/P EST MOD 30 MIN: CPT | Performed by: INTERNAL MEDICINE

## 2023-01-12 PROCEDURE — 1123F ACP DISCUSS/DSCN MKR DOCD: CPT | Performed by: INTERNAL MEDICINE

## 2023-01-12 RX ORDER — HYDROXYZINE HYDROCHLORIDE 25 MG/1
25 TABLET, FILM COATED ORAL EVERY 8 HOURS PRN
Qty: 30 TABLET | Refills: 0 | Status: SHIPPED | OUTPATIENT
Start: 2023-01-12 | End: 2023-01-22

## 2023-01-12 RX ORDER — LOPERAMIDE HYDROCHLORIDE 2 MG/1
2 CAPSULE ORAL 4 TIMES DAILY PRN
Qty: 40 CAPSULE | Refills: 1 | Status: SHIPPED | OUTPATIENT
Start: 2023-01-12 | End: 2023-01-22

## 2023-01-12 RX ORDER — METHYLPREDNISOLONE 4 MG/1
TABLET ORAL
Qty: 1 KIT | Refills: 0 | Status: SHIPPED | OUTPATIENT
Start: 2023-01-12 | End: 2023-01-18

## 2023-01-12 RX ORDER — METFORMIN HYDROCHLORIDE 500 MG/1
1000 TABLET, EXTENDED RELEASE ORAL
Qty: 60 TABLET | Refills: 5 | Status: SHIPPED | OUTPATIENT
Start: 2023-01-12

## 2023-01-12 ASSESSMENT — ENCOUNTER SYMPTOMS
SHORTNESS OF BREATH: 0
EYE PAIN: 0
ABDOMINAL DISTENTION: 0
CONSTIPATION: 0
BACK PAIN: 0
WHEEZING: 0
CHEST TIGHTNESS: 0
ABDOMINAL PAIN: 0
TROUBLE SWALLOWING: 0
SINUS PRESSURE: 0
DIARRHEA: 1
COUGH: 0
BLOOD IN STOOL: 0
NAUSEA: 0
EYE REDNESS: 0
COLOR CHANGE: 0
SORE THROAT: 0
VOMITING: 0

## 2023-01-12 ASSESSMENT — PATIENT HEALTH QUESTIONNAIRE - PHQ9
2. FEELING DOWN, DEPRESSED OR HOPELESS: 1
SUM OF ALL RESPONSES TO PHQ QUESTIONS 1-9: 4
SUM OF ALL RESPONSES TO PHQ QUESTIONS 1-9: 4
8. MOVING OR SPEAKING SO SLOWLY THAT OTHER PEOPLE COULD HAVE NOTICED. OR THE OPPOSITE, BEING SO FIGETY OR RESTLESS THAT YOU HAVE BEEN MOVING AROUND A LOT MORE THAN USUAL: 0
5. POOR APPETITE OR OVEREATING: 0
4. FEELING TIRED OR HAVING LITTLE ENERGY: 1
SUM OF ALL RESPONSES TO PHQ9 QUESTIONS 1 & 2: 2
6. FEELING BAD ABOUT YOURSELF - OR THAT YOU ARE A FAILURE OR HAVE LET YOURSELF OR YOUR FAMILY DOWN: 0
9. THOUGHTS THAT YOU WOULD BE BETTER OFF DEAD, OR OF HURTING YOURSELF: 0
7. TROUBLE CONCENTRATING ON THINGS, SUCH AS READING THE NEWSPAPER OR WATCHING TELEVISION: 0
1. LITTLE INTEREST OR PLEASURE IN DOING THINGS: 1
3. TROUBLE FALLING OR STAYING ASLEEP: 1
SUM OF ALL RESPONSES TO PHQ QUESTIONS 1-9: 4
10. IF YOU CHECKED OFF ANY PROBLEMS, HOW DIFFICULT HAVE THESE PROBLEMS MADE IT FOR YOU TO DO YOUR WORK, TAKE CARE OF THINGS AT HOME, OR GET ALONG WITH OTHER PEOPLE: 0
SUM OF ALL RESPONSES TO PHQ QUESTIONS 1-9: 4

## 2023-01-12 NOTE — ASSESSMENT & PLAN NOTE
Urticaria seen to back   Will prescribe medrol dose pack   and hydroxyzine   Patient informed that hydroxyzine may cause sedation, exercise caution to avoid tripping and falling

## 2023-01-12 NOTE — PROGRESS NOTES
Chief Complaint -     Cyril Lipoma is 81 y.o. male with a PMH of T2DM who p/w Skin Problem and Diarrhea    Focused visit   This a 81 y.o. male who p/w  pruritic rash. The patient complained of  pruritic rash involving his body for 3 weeks. Patient  says rsh involves his back  and it's bother some. Patient denies started new medication, change in laundry detergents or toiletries. Patient unsure what might have triggered this rash. Patient denies fever or chills. The patient's appetite is normal.    The problem and medicine lists and chart were reviewed in detail. Other problems review    1. Diarrhea - Patient c/o diarrhea for 1 month loose bowels he's going to  bathroom after he eats. Denies blood. Denies abdominal pain. Review of Systems   Constitutional:  Negative for activity change, appetite change, chills, fatigue, fever and unexpected weight change. HENT:  Negative for congestion, ear pain, postnasal drip, sinus pressure, sore throat, tinnitus and trouble swallowing. Eyes:  Negative for pain and redness. Respiratory:  Negative for cough, chest tightness, shortness of breath and wheezing. Cardiovascular:  Negative for chest pain, palpitations and leg swelling. Gastrointestinal:  Positive for diarrhea. Negative for abdominal distention, abdominal pain, blood in stool, constipation, nausea and vomiting. Endocrine: Negative for cold intolerance, heat intolerance and polydipsia. Genitourinary:  Negative for decreased urine volume, dysuria, flank pain, frequency, hematuria and urgency. Musculoskeletal:  Negative for arthralgias, back pain, joint swelling, neck pain and neck stiffness. Skin:  Positive for rash. Negative for color change. Neurological:  Negative for dizziness, weakness, numbness and headaches. Hematological:  Negative for adenopathy. Psychiatric/Behavioral:  Negative for behavioral problems, sleep disturbance and suicidal ideas. The patient is not nervous/anxious.        BP 112/66 (Site: Left Upper Arm, Position: Sitting, Cuff Size: Medium Adult)   Pulse (!) 112   Resp 14   Ht 5' 3.5\" (1.613 m)   Wt 156 lb (70.8 kg)   SpO2 95%   BMI 27.20 kg/m²    Physical Exam  Constitutional:       General: He is not in acute distress. Appearance: Normal appearance. He is not ill-appearing. HENT:      Head: Normocephalic and atraumatic. Right Ear: Ear canal normal.      Left Ear: Ear canal normal.      Mouth/Throat:      Mouth: Mucous membranes are moist.      Pharynx: No oropharyngeal exudate or posterior oropharyngeal erythema. Eyes:      Extraocular Movements: Extraocular movements intact. Conjunctiva/sclera: Conjunctivae normal.      Pupils: Pupils are equal, round, and reactive to light. Neck:      Vascular: No carotid bruit. Cardiovascular:      Rate and Rhythm: Normal rate and regular rhythm. Pulses: Normal pulses. Heart sounds: Normal heart sounds. No murmur heard. No gallop. Pulmonary:      Effort: Pulmonary effort is normal.      Breath sounds: Normal breath sounds. No wheezing, rhonchi or rales. Abdominal:      General: Abdomen is flat. There is no distension. Tenderness: There is no abdominal tenderness. There is no guarding. Musculoskeletal:         General: No swelling or tenderness. Normal range of motion. Cervical back: No tenderness. Lymphadenopathy:      Cervical: No cervical adenopathy. Skin:     Findings: Rash present. No erythema or lesion. Comments: Urticaria seen to back   Neurological:      General: No focal deficit present. Mental Status: He is alert and oriented to person, place, and time. Mental status is at baseline. Cranial Nerves: No cranial nerve deficit. Motor: No weakness. Psychiatric:         Mood and Affect: Mood normal.         Behavior: Behavior normal.         Thought Content: Thought content normal.         Judgment: Judgment normal.        ASSESSMENT/PLAN:  1.  Pruritic rash  Assessment & Plan:  Urticaria seen to back   Will prescribe medrol dose pack   and hydroxyzine   Patient informed that hydroxyzine may cause sedation, exercise caution to avoid tripping and falling   Orders:  -     hydrOXYzine HCl (ATARAX) 25 MG tablet; Take 1 tablet by mouth every 8 hours as needed for Itching, Disp-30 tablet, R-0Normal  -     methylPREDNISolone (MEDROL DOSEPACK) 4 MG tablet; Take by mouth., Disp-1 kit, R-0Normal  2. Diarrhea, unspecified type  Assessment & Plan:   Diarrhea for 1 month. We will investigate. Patient informed sometimes diarrhea continues beyond a month may need colonoscopy and biopsy to diagnose the cause. Prescribe loperamide. Lexapro can cause diarrhea as a side effect recently started that drug  Change metformin 1000 quick acting twice daily to extended release daily dose with breakfast  Orders:  -     C DIFF TOXIN/ANTIGEN; Future  -     OVA & PARASITE ID/COUNT #1; Future  -     GI Bacterial Pathogens By PCR; Future  3. Type 2 diabetes mellitus without complication, with long-term current use of insulin ([facility])  Assessment & Plan:        A1C:   Lab Results   Component Value Date/Time    LABA1C 7.3 10/26/2022 09:19 AM    LABA1C 7.0 10/14/2022 08:57 AM     Stable- continue home blood sugar monitoring  Counselled on Diet and exercise with goal to achieve appropriate BMI  Continue Diabetic medication as documented in medication list Lantus 25 units nightly, aspart,   Patient having diarrhea, we will switch metformin to extended release 1000 mg with breakfast instead of twice daily. If A1c goes up we will adjust other medication   continue home foot care  Patient agreed with plan with verbal understanding     Orders:  -     metFORMIN (GLUCOPHAGE-XR) 500 MG extended release tablet; Take 2 tablets by mouth daily (with breakfast), Disp-60 tablet, R-5Normal  4.  Unipolar depression ([facility] [address]  Assessment & Plan:  On Lexapro 5 mg daily  Symptoms of depression have improved possibility medicine may be causing diarrhea  5. Colon cancer screening  -     Abiola Acosta MD, Gastroenterology, [facility]      Return in about 3 months (around 4/26/2023) for Follow up. This note was generated in part or in whole with voice recognition software. Voice recognition is usually quite accurate but there are transcription errors that can often occur. All attempts were made to correct these errors I apologized for any  typographical errors that were not detected and corrected. Electronically signed by Merritt Greene.  Jennifer Maya MD on 1/12/2023 at 2:37 PM.

## 2023-01-12 NOTE — ASSESSMENT & PLAN NOTE
A1C:   Lab Results   Component Value Date/Time    LABA1C 7.3 10/26/2022 09:19 AM    LABA1C 7.0 10/14/2022 08:57 AM     Stable- continue home blood sugar monitoring  Counselled on Diet and exercise with goal to achieve appropriate BMI  Continue Diabetic medication as documented in medication list Lantus 25 units nightly, aspart,   Patient having diarrhea, we will switch metformin to extended release 1000 mg with breakfast instead of twice daily.   If A1c goes up we will adjust other medication   continue home foot care  Patient agreed with plan with verbal understanding

## 2023-01-12 NOTE — ASSESSMENT & PLAN NOTE
Diarrhea for 1 month. We will investigate. Patient informed sometimes diarrhea continues beyond a month may need colonoscopy and biopsy to diagnose the cause. Prescribe loperamide.     Lexapro can cause diarrhea as a side effect recently started that drug  Change metformin 1000 quick acting twice daily to extended release daily dose with breakfast

## 2023-01-12 NOTE — ASSESSMENT & PLAN NOTE
On Lexapro 5 mg daily  Symptoms of depression have improved possibility medicine may be causing diarrhea

## 2023-01-30 ENCOUNTER — OFFICE VISIT (OUTPATIENT)
Dept: PRIMARY CARE CLINIC | Age: 73
End: 2023-01-30
Payer: MEDICAID

## 2023-01-30 ENCOUNTER — TELEPHONE (OUTPATIENT)
Dept: PRIMARY CARE CLINIC | Age: 73
End: 2023-01-30

## 2023-01-30 VITALS
WEIGHT: 156 LBS | BODY MASS INDEX: 26.63 KG/M2 | HEART RATE: 110 BPM | SYSTOLIC BLOOD PRESSURE: 122 MMHG | DIASTOLIC BLOOD PRESSURE: 80 MMHG | HEIGHT: 64 IN | OXYGEN SATURATION: 98 % | RESPIRATION RATE: 14 BRPM

## 2023-01-30 DIAGNOSIS — R19.7 DIARRHEA, UNSPECIFIED TYPE: ICD-10-CM

## 2023-01-30 DIAGNOSIS — L28.2 PRURITIC RASH: ICD-10-CM

## 2023-01-30 DIAGNOSIS — L28.2 PRURITIC RASH: Primary | ICD-10-CM

## 2023-01-30 LAB — IGA: 245 MG/DL (ref 70–400)

## 2023-01-30 PROCEDURE — 99213 OFFICE O/P EST LOW 20 MIN: CPT | Performed by: INTERNAL MEDICINE

## 2023-01-30 PROCEDURE — 1123F ACP DISCUSS/DSCN MKR DOCD: CPT | Performed by: INTERNAL MEDICINE

## 2023-01-30 RX ORDER — FEXOFENADINE HCL 180 MG/1
180 TABLET ORAL DAILY
Qty: 30 TABLET | Refills: 5 | Status: SHIPPED | OUTPATIENT
Start: 2023-01-30 | End: 2023-03-01

## 2023-01-30 ASSESSMENT — ENCOUNTER SYMPTOMS
SHORTNESS OF BREATH: 0
BLOOD IN STOOL: 0
COLOR CHANGE: 0
ABDOMINAL DISTENTION: 0
BACK PAIN: 0
NAUSEA: 0
CONSTIPATION: 0
VOMITING: 0
EYE REDNESS: 0
WHEEZING: 0
SORE THROAT: 0
ABDOMINAL PAIN: 0
TROUBLE SWALLOWING: 0
EYE PAIN: 0
SINUS PRESSURE: 0
CHEST TIGHTNESS: 0
DIARRHEA: 1
COUGH: 0

## 2023-01-30 NOTE — PROGRESS NOTES
Chief Complaint -rash and diarrhea    Stone Gallagher is 81 y.o. male who p/w Follow-up  This a 81 y.o. male who c/o persistent pruritic rash. The patient complained of  pruritic rash involving his body for 3 weeks. Patient  says rash involves his back  and it's bother some. Patient denies started new medication, change in laundry detergents or toiletries. Patient unsure what might have triggered this rash. Patient denies fever or chills. The patient's appetite is normal.     The problem and medicine lists and chart were reviewed in detail. Other problems review    1. Diarrhea - Patient c/o diarrhea for 1 month loose bowels he's going to  bathroom after he eats. Denies blood. Denies abdominal pain. Review of Systems   Constitutional:  Negative for activity change, appetite change, chills, fatigue, fever and unexpected weight change. HENT:  Negative for congestion, ear pain, postnasal drip, sinus pressure, sore throat, tinnitus and trouble swallowing. Eyes:  Negative for pain and redness. Respiratory:  Negative for cough, chest tightness, shortness of breath and wheezing. Cardiovascular:  Negative for chest pain, palpitations and leg swelling. Gastrointestinal:  Positive for diarrhea. Negative for abdominal distention, abdominal pain, blood in stool, constipation, nausea and vomiting. Endocrine: Negative for cold intolerance, heat intolerance and polydipsia. Genitourinary:  Negative for decreased urine volume, dysuria, flank pain, frequency, hematuria and urgency. Musculoskeletal:  Negative for arthralgias, back pain, joint swelling, neck pain and neck stiffness. Skin:  Positive for rash. Negative for color change. Neurological:  Negative for dizziness, weakness, numbness and headaches. Hematological:  Negative for adenopathy. Psychiatric/Behavioral:  Negative for behavioral problems, sleep disturbance and suicidal ideas. The patient is not nervous/anxious.        /80 (Site: Left Upper Arm, Position: Sitting, Cuff Size: Medium Adult)   Pulse (!) 110   Resp 14   Ht 5' 3.5\" (1.613 m)   Wt 156 lb (70.8 kg)   SpO2 98%   BMI 27.20 kg/m²    Physical Exam  Constitutional:       General: He is not in acute distress. Appearance: Normal appearance. He is not ill-appearing. HENT:      Head: Normocephalic and atraumatic. Right Ear: Ear canal normal.      Left Ear: Ear canal normal.      Mouth/Throat:      Mouth: Mucous membranes are moist.      Pharynx: No oropharyngeal exudate or posterior oropharyngeal erythema. Eyes:      Extraocular Movements: Extraocular movements intact. Conjunctiva/sclera: Conjunctivae normal.      Pupils: Pupils are equal, round, and reactive to light. Neck:      Vascular: No carotid bruit. Cardiovascular:      Rate and Rhythm: Normal rate and regular rhythm. Pulses: Normal pulses. Heart sounds: Normal heart sounds. No murmur heard. No gallop. Pulmonary:      Effort: Pulmonary effort is normal.      Breath sounds: Normal breath sounds. No wheezing, rhonchi or rales. Abdominal:      General: Abdomen is flat. There is no distension. Tenderness: There is no abdominal tenderness. There is no guarding. Musculoskeletal:         General: No swelling or tenderness. Normal range of motion. Cervical back: No tenderness. Lymphadenopathy:      Cervical: No cervical adenopathy. Skin:     Findings: Rash present. No erythema or lesion. Comments: Urticaria seen to back   Neurological:      General: No focal deficit present. Mental Status: He is alert and oriented to person, place, and time. Mental status is at baseline. Cranial Nerves: No cranial nerve deficit. Motor: No weakness. Psychiatric:         Mood and Affect: Mood normal.         Behavior: Behavior normal.         Thought Content: Thought content normal.         Judgment: Judgment normal.        ASSESSMENT/PLAN:  1.  Pruritic rash  Assessment & Plan:  Urticaria seen to back   Will prescribe fexofenadine    Orders:  -     Celiac Screen with Reflex; Future  -     fexofenadine (ALLEGRA) 180 MG tablet; Take 1 tablet by mouth daily, Disp-30 tablet, R-5Normal  2. Diarrhea, unspecified type  Assessment & Plan:   Diarrhea for 1 month. We will investigate. Patient informed sometimes diarrhea continues beyond a month may need colonoscopy and biopsy to diagnose the cause. Prescribe loperamide. Lexapro can cause diarrhea as a side effect recently started that drug  Change metformin 1000 quick acting twice daily to extended release daily dose with breakfast  Orders:  -     Celiac Screen with Reflex; Future      Return in about 2 months (around 4/4/2023) for Annual physical.     This note was generated in part or in whole with voice recognition software. Voice recognition is usually quite accurate but there are transcription errors that can often occur. All attempts were made to correct these errors I apologized for any  typographical errors that were not detected and corrected. Electronically signed by Clyde Celaya.  Tan Leonard MD on 1/30/2023 at 2:45 PM.

## 2023-01-31 LAB — TISSUE TRANSGLUTAMINASE IGA: <0.5 U/ML (ref 0–14)

## 2023-01-31 NOTE — TELEPHONE ENCOUNTER
The prescription is  in the chart. Is the patient taking this medication? If so I need a new script please. If this requires a response please respond to the pool. [address]). thank you.

## 2023-02-06 ENCOUNTER — TELEPHONE (OUTPATIENT)
Dept: PRIMARY CARE CLINIC | Age: 73
End: 2023-02-06

## 2023-02-09 NOTE — TELEPHONE ENCOUNTER
PA doesn't go through [employer]. [facility] Medicaid [employer]    Submitted PA for [facility]  Via CMM Key: [id number] STATUS: DENIED. LETTER ATTACHED. If this requires a response please respond to the pool. [address]). Please advise patient thank you.

## 2023-02-21 ENCOUNTER — OFFICE VISIT (OUTPATIENT)
Dept: PRIMARY CARE CLINIC | Age: 73
End: 2023-02-21

## 2023-02-21 VITALS
OXYGEN SATURATION: 94 % | BODY MASS INDEX: 27.31 KG/M2 | SYSTOLIC BLOOD PRESSURE: 134 MMHG | DIASTOLIC BLOOD PRESSURE: 76 MMHG | HEART RATE: 104 BPM | HEIGHT: 64 IN | WEIGHT: 160 LBS | RESPIRATION RATE: 14 BRPM

## 2023-02-21 DIAGNOSIS — R53.83 OTHER FATIGUE: ICD-10-CM

## 2023-02-21 DIAGNOSIS — F33.9 UNIPOLAR DEPRESSION (HCC): ICD-10-CM

## 2023-02-21 DIAGNOSIS — E11.9 TYPE 2 DIABETES MELLITUS WITHOUT COMPLICATION, WITH LONG-TERM CURRENT USE OF INSULIN (HCC): Primary | ICD-10-CM

## 2023-02-21 DIAGNOSIS — Z79.4 TYPE 2 DIABETES MELLITUS WITHOUT COMPLICATION, WITH LONG-TERM CURRENT USE OF INSULIN (HCC): ICD-10-CM

## 2023-02-21 DIAGNOSIS — E11.69 HYPERLIPIDEMIA ASSOCIATED WITH TYPE 2 DIABETES MELLITUS (HCC): ICD-10-CM

## 2023-02-21 DIAGNOSIS — E11.9 TYPE 2 DIABETES MELLITUS WITHOUT COMPLICATION, WITH LONG-TERM CURRENT USE OF INSULIN (HCC): ICD-10-CM

## 2023-02-21 DIAGNOSIS — E78.5 HYPERLIPIDEMIA ASSOCIATED WITH TYPE 2 DIABETES MELLITUS (HCC): ICD-10-CM

## 2023-02-21 DIAGNOSIS — Z23 NEED FOR SHINGLES VACCINE: ICD-10-CM

## 2023-02-21 DIAGNOSIS — Z79.4 TYPE 2 DIABETES MELLITUS WITHOUT COMPLICATION, WITH LONG-TERM CURRENT USE OF INSULIN (HCC): Primary | ICD-10-CM

## 2023-02-21 DIAGNOSIS — Z12.11 COLON CANCER SCREENING: ICD-10-CM

## 2023-02-21 LAB
A/G RATIO: 1.4 (ref 1.1–2.2)
ALBUMIN SERPL-MCNC: 3.8 G/DL (ref 3.4–5)
ALP BLD-CCNC: 97 U/L (ref 40–129)
ALT SERPL-CCNC: 36 U/L (ref 10–40)
ANION GAP SERPL CALCULATED.3IONS-SCNC: 13 MMOL/L (ref 3–16)
AST SERPL-CCNC: <5 U/L (ref 15–37)
BASOPHILS ABSOLUTE: 0.1 K/UL (ref 0–0.2)
BASOPHILS RELATIVE PERCENT: 1 %
BILIRUB SERPL-MCNC: <0.2 MG/DL (ref 0–1)
BUN BLDV-MCNC: 12 MG/DL (ref 7–20)
CALCIUM SERPL-MCNC: 8.8 MG/DL (ref 8.3–10.6)
CHLORIDE BLD-SCNC: 104 MMOL/L (ref 99–110)
CO2: 21 MMOL/L (ref 21–32)
CREAT SERPL-MCNC: 0.8 MG/DL (ref 0.8–1.3)
EOSINOPHILS ABSOLUTE: 0.5 K/UL (ref 0–0.6)
EOSINOPHILS RELATIVE PERCENT: 7 %
GFR SERPL CREATININE-BSD FRML MDRD: >60 ML/MIN/{1.73_M2}
GLUCOSE BLD-MCNC: 258 MG/DL (ref 70–99)
HBA1C MFR BLD: 6.9 %
HCT VFR BLD CALC: 41.1 % (ref 40.5–52.5)
HEMOGLOBIN: 14 G/DL (ref 13.5–17.5)
LYMPHOCYTES ABSOLUTE: 1.4 K/UL (ref 1–5.1)
LYMPHOCYTES RELATIVE PERCENT: 20.4 %
MCH RBC QN AUTO: 29.5 PG (ref 26–34)
MCHC RBC AUTO-ENTMCNC: 34 G/DL (ref 31–36)
MCV RBC AUTO: 87 FL (ref 80–100)
MONOCYTES ABSOLUTE: 0.6 K/UL (ref 0–1.3)
MONOCYTES RELATIVE PERCENT: 8.5 %
NEUTROPHILS ABSOLUTE: 4.4 K/UL (ref 1.7–7.7)
NEUTROPHILS RELATIVE PERCENT: 63.1 %
PDW BLD-RTO: 14.3 % (ref 12.4–15.4)
PLATELET # BLD: 260 K/UL (ref 135–450)
PMV BLD AUTO: 9.9 FL (ref 5–10.5)
POTASSIUM SERPL-SCNC: 4.4 MMOL/L (ref 3.5–5.1)
RBC # BLD: 4.73 M/UL (ref 4.2–5.9)
SODIUM BLD-SCNC: 138 MMOL/L (ref 136–145)
TOTAL PROTEIN: 6.6 G/DL (ref 6.4–8.2)
WBC # BLD: 6.9 K/UL (ref 4–11)

## 2023-02-21 SDOH — ECONOMIC STABILITY: HOUSING INSECURITY
IN THE LAST 12 MONTHS, WAS THERE A TIME WHEN YOU DID NOT HAVE A STEADY PLACE TO SLEEP OR SLEPT IN A SHELTER (INCLUDING NOW)?: NO

## 2023-02-21 SDOH — ECONOMIC STABILITY: FOOD INSECURITY: WITHIN THE PAST 12 MONTHS, YOU WORRIED THAT YOUR FOOD WOULD RUN OUT BEFORE YOU GOT MONEY TO BUY MORE.: NEVER TRUE

## 2023-02-21 SDOH — ECONOMIC STABILITY: FOOD INSECURITY: WITHIN THE PAST 12 MONTHS, THE FOOD YOU BOUGHT JUST DIDN'T LAST AND YOU DIDN'T HAVE MONEY TO GET MORE.: NEVER TRUE

## 2023-02-21 SDOH — ECONOMIC STABILITY: INCOME INSECURITY: HOW HARD IS IT FOR YOU TO PAY FOR THE VERY BASICS LIKE FOOD, HOUSING, MEDICAL CARE, AND HEATING?: NOT HARD AT ALL

## 2023-02-21 ASSESSMENT — ENCOUNTER SYMPTOMS
CHEST TIGHTNESS: 0
BLOOD IN STOOL: 0
DIARRHEA: 0
TROUBLE SWALLOWING: 0
NAUSEA: 0
VOMITING: 0
EYE PAIN: 0
COLOR CHANGE: 0
BACK PAIN: 0
SORE THROAT: 0
CONSTIPATION: 0
ABDOMINAL DISTENTION: 0
SHORTNESS OF BREATH: 0
EYE REDNESS: 0
ABDOMINAL PAIN: 0
WHEEZING: 0
SINUS PRESSURE: 0
COUGH: 0

## 2023-02-21 NOTE — PROGRESS NOTES
Ole Nguyễn (1950) is a 81 y.o. male   Diabetes     General health: This patient presents for check up and refills. The problem and medicine lists and chart were reviewed in detail. The patient has been worked up and treated for this/these condition/s and is compliant with taking the medication without any significant side effects. The patient's condition/s is/are chronic and unchanged and otherwise remains stable. Feels well with minor complaints. Main Problem Review  T2DM- Patient's blood sugars have been stable. Patient denies complaints or symptoms today no polyuria or polydipsia. Patient mentions adherence with treatment. Patient eats a low-carb diet, pays attention to food portion size. Patient tries to exercise regularly. Patient denies chest pain or shortness of breath with exertion or at rest.       Other problems review    1. HLD -  Patient denies complaints or symptoms chest pain shortness of breath with exertion or at rest. Patient mentions he is adherent with treatment. 2.  Unipolar depression -mood is better with Lexapro 10 mg. Denies suicidal ideation or plan. Health Maintenance    Annual retinal eye exam - Patient due  will need to schedule   Annual foot exam - will get in future  Annual Dentist visit - Patient due  will need to schedule   Tobacco smoking - NO  Alcohol Misuse - NO  Illicit Drug Use-  NO  Healthy Diet and physical activity -  YES  Obesity Screen- screened 10/14/22  Wears seat belt- YES  End of life directives discussed with patient. -Mentions he does not have  a will/or/an advanced directives. Was instructed to start process looking into preparing his will an advanced directives. The ASCVD Risk score (Holly GREENE, et al., 2019) failed to calculate for the following reasons: The valid total cholesterol range is 130 to 320 mg/dL     Review of Systems   Constitutional:  Negative for activity change, appetite change, chills, fatigue, fever and unexpected weight change. HENT:  Negative for congestion, ear pain, postnasal drip, sinus pressure, sore throat, tinnitus and trouble swallowing. Eyes:  Negative for pain and redness. Respiratory:  Negative for cough, chest tightness, shortness of breath and wheezing. Cardiovascular:  Negative for chest pain, palpitations and leg swelling. Gastrointestinal:  Negative for abdominal distention, abdominal pain, blood in stool, constipation, diarrhea, nausea and vomiting. Endocrine: Negative for cold intolerance, heat intolerance and polydipsia. Genitourinary:  Negative for decreased urine volume, dysuria, flank pain, frequency, hematuria and urgency. Musculoskeletal:  Negative for arthralgias, back pain, joint swelling, neck pain and neck stiffness. Skin:  Negative for color change and rash. Neurological:  Negative for dizziness, weakness, numbness and headaches. Hematological:  Negative for adenopathy. Psychiatric/Behavioral:  Negative for behavioral problems, sleep disturbance and suicidal ideas. The patient is not nervous/anxious. /76 (Site: Left Upper Arm, Position: Sitting, Cuff Size: Medium Adult)   Pulse (!) 104   Resp 14   Ht 5' 3.5\" (1.613 m)   Wt 160 lb (72.6 kg)   SpO2 94%   BMI 27.90 kg/m²    Physical Exam  Constitutional:       General: He is not in acute distress. Appearance: Normal appearance. He is not ill-appearing. HENT:      Head: Normocephalic and atraumatic. Right Ear: Ear canal normal.      Left Ear: Ear canal normal.      Mouth/Throat:      Mouth: Mucous membranes are moist.      Pharynx: No oropharyngeal exudate or posterior oropharyngeal erythema. Eyes:      Extraocular Movements: Extraocular movements intact. Conjunctiva/sclera: Conjunctivae normal.      Pupils: Pupils are equal, round, and reactive to light. Neck:      Vascular: No carotid bruit. Cardiovascular:      Rate and Rhythm: Normal rate and regular rhythm. Pulses: Normal pulses.       Heart sounds: Normal heart sounds. No murmur heard. No gallop. Pulmonary:      Effort: Pulmonary effort is normal.      Breath sounds: Normal breath sounds. No wheezing, rhonchi or rales. Abdominal:      General: Abdomen is flat. There is no distension. Musculoskeletal:         General: No swelling or tenderness. Normal range of motion. Cervical back: No tenderness. Lymphadenopathy:      Cervical: No cervical adenopathy. Skin:     Findings: No erythema, lesion or rash. Neurological:      General: No focal deficit present. Mental Status: He is alert and oriented to person, place, and time. Mental status is at baseline. Cranial Nerves: No cranial nerve deficit. Motor: No weakness. Psychiatric:         Mood and Affect: Mood normal.         Behavior: Behavior normal.         Thought Content: Thought content normal.         Judgment: Judgment normal.       ASSESSMENT/PLAN:  1. Type 2 diabetes mellitus without complication, with long-term current use of insulin ([facility])  Assessment & Plan:        A1C:   Lab Results   Component Value Date/Time    LABA1C 6.9 02/21/2023 01:13 PM    LABA1C 7.3 10/26/2022 09:19 AM    LABA1C 7.0 10/14/2022 08:57 AM     Stable- continue home blood sugar monitoring  Counselled on Diet and exercise with goal to achieve appropriate BMI  Continue Diabetic medication as documented in medication list Lantus 25 units nightly, aspart,   Patient having diarrhea, we will switch metformin to extended release 1000 mg with breakfast instead of twice daily. If A1c goes up we will adjust other medication   continue home foot care  Patient agreed with plan with verbal understanding     Orders:  -     POCT glycosylated hemoglobin (Hb A1C)  -     CBC with Auto Differential; Future  -     Comprehensive Metabolic Panel; Future  2.  Hyperlipidemia associated with type 2 diabetes mellitus ([facility] [address]  Assessment & Plan:  Stable and controlled  Continue medication as documented in your medication list  Simvastatin 20 mg nightly  Orders:  -     CBC with Auto Differential; Future  -     Comprehensive Metabolic Panel; Future  3. Unipolar depression (HCC)  Assessment & Plan:  On Lexapro 5 mg daily  Symptoms of depression have improved possibility medicine may be causing diarrhea  4. Other fatigue  -     Vitamin B12 & Folate; Future  5. Colon cancer screening  Assessment & Plan:   Colonoscopy scheduled for 5/23  6. Need for shingles vaccine  -     Zoster, SHINGRIX, (50 yrs +), IM        Preventative care discussed. Encouraged healthy diet choices, reg exercise. Patient agreed w/plan and verbal understanding. Patient advised to call or return with any concerns or problems or worsening conditions. Go to the ER for any severe or potentially life threatening problems. Return in about 4 months (around 6/21/2023) for 30-minute follow-up. This note was generated in part or in whole with voice recognition software. Voice recognition is usually quite accurate but there are transcription errors that can often occur. All attempts were made to correct these errors I apologized for any  typographical errors that were not detected and corrected. Electronically signed by Fern Maher.  Marquita Delgadillo MD on 2/21/2023 at 1:28 PM.

## 2023-02-21 NOTE — ASSESSMENT & PLAN NOTE
A1C:   Lab Results   Component Value Date/Time    LABA1C 6.9 02/21/2023 01:13 PM    LABA1C 7.3 10/26/2022 09:19 AM    LABA1C 7.0 10/14/2022 08:57 AM     Stable- continue home blood sugar monitoring  Counselled on Diet and exercise with goal to achieve appropriate BMI  Continue Diabetic medication as documented in medication list Lantus 25 units nightly, aspart,   Patient having diarrhea, we will switch metformin to extended release 1000 mg with breakfast instead of twice daily.   If A1c goes up we will adjust other medication   continue home foot care  Patient agreed with plan with verbal understanding

## 2023-02-22 DIAGNOSIS — E53.8 LOW VITAMIN B12 LEVEL: Primary | ICD-10-CM

## 2023-02-22 DIAGNOSIS — E53.8 LOW VITAMIN B12 LEVEL: ICD-10-CM

## 2023-02-22 LAB
FOLATE: 8.57 NG/ML (ref 4.78–24.2)
VITAMIN B-12: 277 PG/ML (ref 211–911)

## 2023-02-22 RX ORDER — CHOLECALCIFEROL (VITAMIN D3) 125 MCG
500 CAPSULE ORAL DAILY
Qty: 90 TABLET | Refills: 3 | Status: SHIPPED | OUTPATIENT
Start: 2023-02-22 | End: 2023-02-22 | Stop reason: SDUPTHER

## 2023-02-22 RX ORDER — CHOLECALCIFEROL (VITAMIN D3) 125 MCG
500 CAPSULE ORAL DAILY
Qty: 90 TABLET | Refills: 3 | Status: SHIPPED | OUTPATIENT
Start: 2023-02-22 | End: 2024-02-22

## 2023-03-15 DIAGNOSIS — I10 BENIGN ESSENTIAL HTN: ICD-10-CM

## 2023-03-15 RX ORDER — LISINOPRIL 5 MG/1
TABLET ORAL
Qty: 30 TABLET | Refills: 5 | Status: SHIPPED | OUTPATIENT
Start: 2023-03-15

## 2023-03-23 PROBLEM — Z12.11 COLON CANCER SCREENING: Status: RESOLVED | Noted: 2023-02-21 | Resolved: 2023-03-23

## 2023-03-29 DIAGNOSIS — G47.00 INSOMNIA, UNSPECIFIED TYPE: ICD-10-CM

## 2023-03-29 DIAGNOSIS — F33.9 UNIPOLAR DEPRESSION (HCC): ICD-10-CM

## 2023-03-29 RX ORDER — TRAZODONE HYDROCHLORIDE 50 MG/1
TABLET ORAL
Qty: 30 TABLET | Refills: 5 | Status: SHIPPED | OUTPATIENT
Start: 2023-03-29

## 2023-04-19 ENCOUNTER — NURSE ONLY (OUTPATIENT)
Dept: PRIMARY CARE CLINIC | Age: 73
End: 2023-04-19

## 2023-04-19 VITALS — DIASTOLIC BLOOD PRESSURE: 84 MMHG | SYSTOLIC BLOOD PRESSURE: 134 MMHG | HEART RATE: 98 BPM

## 2023-06-14 DIAGNOSIS — G47.00 INSOMNIA, UNSPECIFIED TYPE: ICD-10-CM

## 2023-06-14 DIAGNOSIS — F33.9 UNIPOLAR DEPRESSION (HCC): ICD-10-CM

## 2023-06-14 DIAGNOSIS — Z79.4 TYPE 2 DIABETES MELLITUS WITHOUT COMPLICATION, WITH LONG-TERM CURRENT USE OF INSULIN (HCC): ICD-10-CM

## 2023-06-14 DIAGNOSIS — E78.5 HYPERLIPIDEMIA ASSOCIATED WITH TYPE 2 DIABETES MELLITUS (HCC): ICD-10-CM

## 2023-06-14 DIAGNOSIS — I10 BENIGN ESSENTIAL HTN: ICD-10-CM

## 2023-06-14 DIAGNOSIS — E11.9 TYPE 2 DIABETES MELLITUS WITHOUT COMPLICATION, WITH LONG-TERM CURRENT USE OF INSULIN (HCC): ICD-10-CM

## 2023-06-14 DIAGNOSIS — E11.69 HYPERLIPIDEMIA ASSOCIATED WITH TYPE 2 DIABETES MELLITUS (HCC): ICD-10-CM

## 2023-06-14 RX ORDER — TRAZODONE HYDROCHLORIDE 50 MG/1
TABLET ORAL
Qty: 30 TABLET | Refills: 5 | Status: SHIPPED | OUTPATIENT
Start: 2023-06-14

## 2023-06-14 RX ORDER — SIMVASTATIN 20 MG
TABLET ORAL
Qty: 30 TABLET | Refills: 5 | Status: SHIPPED | OUTPATIENT
Start: 2023-06-14

## 2023-06-14 RX ORDER — LISINOPRIL 5 MG/1
TABLET ORAL
Qty: 30 TABLET | Refills: 5 | Status: SHIPPED | OUTPATIENT
Start: 2023-06-14

## 2023-06-14 RX ORDER — METFORMIN HYDROCHLORIDE 500 MG/1
TABLET, EXTENDED RELEASE ORAL
Qty: 60 TABLET | Refills: 5 | Status: SHIPPED | OUTPATIENT
Start: 2023-06-14

## 2023-06-20 DIAGNOSIS — E11.9 TYPE 2 DIABETES MELLITUS WITHOUT COMPLICATION, WITH LONG-TERM CURRENT USE OF INSULIN (HCC): ICD-10-CM

## 2023-06-20 DIAGNOSIS — Z79.4 TYPE 2 DIABETES MELLITUS WITHOUT COMPLICATION, WITH LONG-TERM CURRENT USE OF INSULIN (HCC): ICD-10-CM

## 2023-06-21 RX ORDER — METFORMIN HYDROCHLORIDE 500 MG/1
TABLET, EXTENDED RELEASE ORAL
Qty: 120 TABLET | Refills: 0 | OUTPATIENT
Start: 2023-06-21

## 2023-07-26 ENCOUNTER — OFFICE VISIT (OUTPATIENT)
Dept: PRIMARY CARE CLINIC | Age: 73
End: 2023-07-26
Payer: MEDICAID

## 2023-07-26 VITALS
OXYGEN SATURATION: 95 % | WEIGHT: 152 LBS | HEART RATE: 93 BPM | BODY MASS INDEX: 25.95 KG/M2 | HEIGHT: 64 IN | SYSTOLIC BLOOD PRESSURE: 110 MMHG | DIASTOLIC BLOOD PRESSURE: 84 MMHG | RESPIRATION RATE: 14 BRPM

## 2023-07-26 DIAGNOSIS — Z79.4 TYPE 2 DIABETES MELLITUS WITHOUT COMPLICATION, WITH LONG-TERM CURRENT USE OF INSULIN (HCC): Primary | ICD-10-CM

## 2023-07-26 DIAGNOSIS — E11.69 HYPERLIPIDEMIA ASSOCIATED WITH TYPE 2 DIABETES MELLITUS (HCC): ICD-10-CM

## 2023-07-26 DIAGNOSIS — F33.9 UNIPOLAR DEPRESSION (HCC): ICD-10-CM

## 2023-07-26 DIAGNOSIS — E78.5 HYPERLIPIDEMIA ASSOCIATED WITH TYPE 2 DIABETES MELLITUS (HCC): ICD-10-CM

## 2023-07-26 DIAGNOSIS — Z79.4 TYPE 2 DIABETES MELLITUS WITHOUT COMPLICATION, WITH LONG-TERM CURRENT USE OF INSULIN (HCC): ICD-10-CM

## 2023-07-26 DIAGNOSIS — E11.9 TYPE 2 DIABETES MELLITUS WITHOUT COMPLICATION, WITH LONG-TERM CURRENT USE OF INSULIN (HCC): ICD-10-CM

## 2023-07-26 DIAGNOSIS — M25.562 CHRONIC PAIN OF BOTH KNEES: ICD-10-CM

## 2023-07-26 DIAGNOSIS — M25.561 CHRONIC PAIN OF BOTH KNEES: ICD-10-CM

## 2023-07-26 DIAGNOSIS — G89.29 CHRONIC PAIN OF BOTH KNEES: ICD-10-CM

## 2023-07-26 DIAGNOSIS — E11.9 TYPE 2 DIABETES MELLITUS WITHOUT COMPLICATION, WITH LONG-TERM CURRENT USE OF INSULIN (HCC): Primary | ICD-10-CM

## 2023-07-26 LAB
ALBUMIN SERPL-MCNC: 4.2 G/DL (ref 3.4–5)
ALBUMIN/GLOB SERPL: 1.6 {RATIO} (ref 1.1–2.2)
ALP SERPL-CCNC: 89 U/L (ref 40–129)
ALT SERPL-CCNC: 27 U/L (ref 10–40)
ANION GAP SERPL CALCULATED.3IONS-SCNC: 13 MMOL/L (ref 3–16)
AST SERPL-CCNC: 21 U/L (ref 15–37)
BILIRUB SERPL-MCNC: 0.4 MG/DL (ref 0–1)
BUN SERPL-MCNC: 11 MG/DL (ref 7–20)
CALCIUM SERPL-MCNC: 9.3 MG/DL (ref 8.3–10.6)
CHLORIDE SERPL-SCNC: 105 MMOL/L (ref 99–110)
CO2 SERPL-SCNC: 23 MMOL/L (ref 21–32)
CREAT SERPL-MCNC: 0.8 MG/DL (ref 0.8–1.3)
CREAT UR-MCNC: 61.9 MG/DL (ref 39–259)
GFR SERPLBLD CREATININE-BSD FMLA CKD-EPI: >60 ML/MIN/{1.73_M2}
GLUCOSE SERPL-MCNC: 122 MG/DL (ref 70–99)
HBA1C MFR BLD: 8.3 %
MICROALBUMIN UR DL<=1MG/L-MCNC: <1.2 MG/DL
MICROALBUMIN/CREAT UR: NORMAL MG/G (ref 0–30)
POTASSIUM SERPL-SCNC: 4.4 MMOL/L (ref 3.5–5.1)
PROT SERPL-MCNC: 6.9 G/DL (ref 6.4–8.2)
SODIUM SERPL-SCNC: 141 MMOL/L (ref 136–145)

## 2023-07-26 PROCEDURE — 1123F ACP DISCUSS/DSCN MKR DOCD: CPT | Performed by: INTERNAL MEDICINE

## 2023-07-26 PROCEDURE — 99214 OFFICE O/P EST MOD 30 MIN: CPT | Performed by: INTERNAL MEDICINE

## 2023-07-26 PROCEDURE — 3052F HG A1C>EQUAL 8.0%<EQUAL 9.0%: CPT | Performed by: INTERNAL MEDICINE

## 2023-07-26 PROCEDURE — 83037 HB GLYCOSYLATED A1C HOME DEV: CPT | Performed by: INTERNAL MEDICINE

## 2023-07-26 ASSESSMENT — ENCOUNTER SYMPTOMS
SHORTNESS OF BREATH: 0
NAUSEA: 0
VOMITING: 0
EYE REDNESS: 0
BACK PAIN: 0
ABDOMINAL PAIN: 0
BLOOD IN STOOL: 0
COUGH: 0
CHEST TIGHTNESS: 0
CONSTIPATION: 0
DIARRHEA: 0
EYE PAIN: 0
TROUBLE SWALLOWING: 0
COLOR CHANGE: 0
ABDOMINAL DISTENTION: 0
SORE THROAT: 0
SINUS PRESSURE: 0
WHEEZING: 0

## 2023-07-26 NOTE — ASSESSMENT & PLAN NOTE
A1C:   Lab Results   Component Value Date/Time    LABA1C 8.3 07/26/2023 09:21 AM    LABA1C 6.9 02/21/2023 01:13 PM    LABA1C 7.3 10/26/2022 09:19 AM    LABA1C 7.0 10/14/2022 08:57 AM     A1C Increased   continue home blood sugar monitoring  Counselled on Diet and exercise with goal to achieve appropriate BMI  Continue Diabetic medication as documented in medication list Lantus 25 units nightly, aspart,   Patient was having diarrhea, switched metformin to extended release 1000 mg with breakfast instead of twice daily.   A1c went  Up -we will get urine for albumin and CMP, likely will prescribe Jardiance/Farxiga based on results  continue home foot care  Patient agreed with plan with verbal understanding

## 2023-07-26 NOTE — PROGRESS NOTES
Meryle Cutter (1950) is a 72 y.o. male   Diabetes     General health: This patient presents for check up and refills. The problem and medicine lists and chart were reviewed in detail. The patient has been worked up and treated for this/these condition/s and is compliant with taking the medication without any significant side effects. The patient's condition/s is/are chronic and unchanged and otherwise remains stable. Feels well with minor complaints. Main Problem Review -T2DM-   Patient denies complaints or symptoms today no polyuria or polydipsia. Patient mentions adherence with treatment. Patient eats a low-carb diet, pays attention to food portion size. Patient tries to exercise regularly. Patient denies chest pain or shortness of breath with exertion or at rest.       Other problems review    1. HLD -  Patient denies complaints or symptoms chest pain shortness of breath with exertion or at rest. Patient mentions he is adherent with treatment. 2.  Unipolar depression -mood is better with Lexapro 10 mg. Denies suicidal ideation or plan. Health Maintenance    Annual retinal eye exam - Patient due  will need to schedule   Annual foot exam - will get in future  Annual Dentist visit - Patient due  will need to schedule   Tobacco smoking - NO  Alcohol Misuse - NO  Illicit Drug Use-  NO  Healthy Diet and physical activity -  YES  Obesity Screen- screened 10/14/22  Wears seat belt- YES  End of life directives discussed with patient. -Mentions he does not have  a will/or/an advanced directives. Was instructed to start process looking into preparing his will an advanced directives. The ASCVD Risk score (Holly GREENE, et al., 2019) failed to calculate for the following reasons: The valid total cholesterol range is 130 to 320 mg/dL     Review of Systems   Constitutional:  Negative for activity change, appetite change, chills, fatigue, fever and unexpected weight change.    HENT:  Negative for congestion, ear

## 2023-07-26 NOTE — ASSESSMENT & PLAN NOTE
Stable and controlled  Continue medication as documented in your medication list   On Lexapro 5 mg daily

## 2023-08-10 ENCOUNTER — OFFICE VISIT (OUTPATIENT)
Dept: ORTHOPEDIC SURGERY | Age: 73
End: 2023-08-10

## 2023-08-10 VITALS — HEIGHT: 64 IN | WEIGHT: 152 LBS | BODY MASS INDEX: 25.95 KG/M2

## 2023-08-10 DIAGNOSIS — M79.604 RIGHT LEG PAIN: Primary | ICD-10-CM

## 2023-08-10 DIAGNOSIS — M25.571 RIGHT ANKLE PAIN, UNSPECIFIED CHRONICITY: ICD-10-CM

## 2023-08-17 NOTE — PROGRESS NOTES
apparent distress  Examination of the right knee   There is a trace effusion, no gross deformity or skin changes  Range of motion reveals 0 to 130  Neg lachman, negative posterior drawer, no pain or laxity with varus or valgus stress at 0 degrees and 30 degrees of flexion  no joint line tenderness  Evaluation of right ankle demonstrates that there is a mass over the lateral malleolus present with an eschar over top of it there are some tenderness to palpation here, no evidence of infection  5 out of 5 strength throughout distal muscle groups  Sensation is intact to light touch throughout all distributions  There is no calf swelling or tenderness  Palpable DP pulse, brisk cap refill, 2+ symmetric reflexes     Imagin view x-rays of the right knee were obtained the office today on 8/10/2023 and reviewed. Early medial joint space narrowing is present. Otherwise unremarkable x-rays with no fracture or dislocation. Three-view x-rays of the right ankle obtained in office today on edentulous for 3 and reviewed. No fracture dislocation. No other abnormality      Assessment:  1. Right ankle pain with lateral mass  2. Right knee early compartment degenerative joint disease    Plan:  The patient does report that his knee really does not bother him. However in regards to ankle would recommend MRI for further work-up. Following MRI he will follow-up with my partner to review and discuss definitive treatment options. Greater than 45 minutes were spent with this encounter. Time spent included evaluating the patient's chart prior to arrival.  Evaluating the patient in the office including history, physical examination, imaging reviewing, and counseling on next steps. Lastly, time was spent discussing orders with my staff as well as providing documentation in the chart.                     Cruz Lopez MD            [facility] and [place]

## 2023-09-22 ENCOUNTER — OFFICE VISIT (OUTPATIENT)
Dept: PRIMARY CARE CLINIC | Age: 73
End: 2023-09-22
Payer: MEDICAID

## 2023-09-22 VITALS
HEIGHT: 64 IN | OXYGEN SATURATION: 93 % | RESPIRATION RATE: 14 BRPM | SYSTOLIC BLOOD PRESSURE: 102 MMHG | WEIGHT: 152.6 LBS | BODY MASS INDEX: 26.05 KG/M2 | DIASTOLIC BLOOD PRESSURE: 76 MMHG | HEART RATE: 102 BPM

## 2023-09-22 DIAGNOSIS — E11.9 TYPE 2 DIABETES MELLITUS WITHOUT COMPLICATION, WITH LONG-TERM CURRENT USE OF INSULIN (HCC): ICD-10-CM

## 2023-09-22 DIAGNOSIS — Z79.4 TYPE 2 DIABETES MELLITUS WITHOUT COMPLICATION, WITH LONG-TERM CURRENT USE OF INSULIN (HCC): ICD-10-CM

## 2023-09-22 DIAGNOSIS — E11.69 HYPERLIPIDEMIA ASSOCIATED WITH TYPE 2 DIABETES MELLITUS (HCC): ICD-10-CM

## 2023-09-22 DIAGNOSIS — R41.89 PERSISTENT COGNITIVE IMPAIRMENT: ICD-10-CM

## 2023-09-22 DIAGNOSIS — E78.5 HYPERLIPIDEMIA ASSOCIATED WITH TYPE 2 DIABETES MELLITUS (HCC): ICD-10-CM

## 2023-09-22 DIAGNOSIS — F33.9 UNIPOLAR DEPRESSION (HCC): ICD-10-CM

## 2023-09-22 PROCEDURE — 3052F HG A1C>EQUAL 8.0%<EQUAL 9.0%: CPT | Performed by: INTERNAL MEDICINE

## 2023-09-22 PROCEDURE — 1123F ACP DISCUSS/DSCN MKR DOCD: CPT | Performed by: INTERNAL MEDICINE

## 2023-09-22 PROCEDURE — 99214 OFFICE O/P EST MOD 30 MIN: CPT | Performed by: INTERNAL MEDICINE

## 2023-09-22 ASSESSMENT — ENCOUNTER SYMPTOMS
ABDOMINAL PAIN: 0
BLOOD IN STOOL: 0
TROUBLE SWALLOWING: 0
EYE PAIN: 0
VOMITING: 0
ABDOMINAL DISTENTION: 0
SHORTNESS OF BREATH: 0
COUGH: 0
COLOR CHANGE: 0
BACK PAIN: 0
SINUS PRESSURE: 0
NAUSEA: 0
CHEST TIGHTNESS: 0
WHEEZING: 0
EYE REDNESS: 0
CONSTIPATION: 0
DIARRHEA: 0
SORE THROAT: 0

## 2023-09-22 NOTE — ASSESSMENT & PLAN NOTE
Patient scored 16/30 on Mini-Mental exam  Not currently on Aricept/Namenda  Family members support patient

## 2023-10-17 ENCOUNTER — OFFICE VISIT (OUTPATIENT)
Dept: PRIMARY CARE CLINIC | Age: 73
End: 2023-10-17
Payer: MEDICAID

## 2023-10-17 VITALS
HEIGHT: 64 IN | DIASTOLIC BLOOD PRESSURE: 84 MMHG | WEIGHT: 153 LBS | SYSTOLIC BLOOD PRESSURE: 124 MMHG | BODY MASS INDEX: 26.12 KG/M2 | OXYGEN SATURATION: 95 % | HEART RATE: 117 BPM | RESPIRATION RATE: 14 BRPM

## 2023-10-17 DIAGNOSIS — E55.9 VITAMIN D DEFICIENCY: ICD-10-CM

## 2023-10-17 DIAGNOSIS — E11.69 HYPERLIPIDEMIA ASSOCIATED WITH TYPE 2 DIABETES MELLITUS (HCC): ICD-10-CM

## 2023-10-17 DIAGNOSIS — F32.9 UNIPOLAR DEPRESSION: ICD-10-CM

## 2023-10-17 DIAGNOSIS — E11.9 ENCOUNTER FOR DIABETIC FOOT EXAM (HCC): ICD-10-CM

## 2023-10-17 DIAGNOSIS — Z23 NEEDS FLU SHOT: ICD-10-CM

## 2023-10-17 DIAGNOSIS — Z79.4 TYPE 2 DIABETES MELLITUS WITHOUT COMPLICATION, WITH LONG-TERM CURRENT USE OF INSULIN (HCC): ICD-10-CM

## 2023-10-17 DIAGNOSIS — E11.9 TYPE 2 DIABETES MELLITUS WITHOUT COMPLICATION, WITH LONG-TERM CURRENT USE OF INSULIN (HCC): ICD-10-CM

## 2023-10-17 DIAGNOSIS — R53.83 OTHER FATIGUE: ICD-10-CM

## 2023-10-17 DIAGNOSIS — R41.89 PERSISTENT COGNITIVE IMPAIRMENT: ICD-10-CM

## 2023-10-17 DIAGNOSIS — E78.5 HYPERLIPIDEMIA ASSOCIATED WITH TYPE 2 DIABETES MELLITUS (HCC): ICD-10-CM

## 2023-10-17 DIAGNOSIS — Z12.11 COLON CANCER SCREENING: ICD-10-CM

## 2023-10-17 DIAGNOSIS — Z00.00 ENCOUNTER FOR WELL ADULT EXAM WITHOUT ABNORMAL FINDINGS: Primary | ICD-10-CM

## 2023-10-17 LAB — HBA1C MFR BLD: 9.5 %

## 2023-10-17 PROCEDURE — 90694 VACC AIIV4 NO PRSRV 0.5ML IM: CPT | Performed by: INTERNAL MEDICINE

## 2023-10-17 PROCEDURE — 99397 PER PM REEVAL EST PAT 65+ YR: CPT | Performed by: INTERNAL MEDICINE

## 2023-10-17 PROCEDURE — 83036 HEMOGLOBIN GLYCOSYLATED A1C: CPT | Performed by: INTERNAL MEDICINE

## 2023-10-17 PROCEDURE — 90471 IMMUNIZATION ADMIN: CPT | Performed by: INTERNAL MEDICINE

## 2023-10-17 RX ORDER — METFORMIN HYDROCHLORIDE 500 MG/1
1000 TABLET, EXTENDED RELEASE ORAL 2 TIMES DAILY WITH MEALS
Qty: 120 TABLET | Refills: 5 | Status: SHIPPED | OUTPATIENT
Start: 2023-10-17

## 2023-10-17 ASSESSMENT — ENCOUNTER SYMPTOMS
ABDOMINAL DISTENTION: 0
EYE PAIN: 0
SINUS PRESSURE: 0
CHEST TIGHTNESS: 0
BACK PAIN: 0
CONSTIPATION: 0
WHEEZING: 0
SHORTNESS OF BREATH: 0
VOMITING: 0
DIARRHEA: 0
ABDOMINAL PAIN: 0
EYE REDNESS: 0
BLOOD IN STOOL: 0
COLOR CHANGE: 0
NAUSEA: 0
TROUBLE SWALLOWING: 0
SORE THROAT: 0
COUGH: 0

## 2023-10-17 NOTE — PROGRESS NOTES
Well Adult Note  Name: Hiren Carter Date: 10/17/2023   MRN: [id number] Sex: Male   Age: 72 y.o. Ethnicity: Non- / Non    : 1950 Race: Other       Gloria Stinson is here for well adult exam.  History:  Wellness exam - Patient presents today for annual physical. Patient  reports feeling well. Patient has a normal appetite. Patient  eats 5+ servings of vegetables/fruits each day. Patient prepares food at home multiple times/day, eats in restaurants rarely. Patient  states that he sleeps well and gets 7-8 hours of sleep on average. In regards to emotional health, patient  denies depression or anxiety. Libido is considered to be normal. In regards to bowel habits, patient  has no complaints. Regarding urination, no complaints. Patient reports they feels safe at home, uses seat belts and has smoke detectors. Health Maintenance    Annual retinal eye exam - Patient due  will need to schedule   Annual foot exam - will get in future  Annual Dentist visit - Patient due  will need to schedule   Tobacco smoking - NO  Alcohol Misuse - NO  Illicit Drug Use-  NO  Healthy Diet and physical activity -  YES  Obesity Screen- screened 10/14/22  Wears seat belt- YES  End of life directives discussed with patient. -Mentions he does not have  a will/or/an advanced directives. Was instructed to start process looking into preparing his will an advanced directives. Review of Systems   Constitutional:  Negative for activity change, appetite change, chills, fatigue, fever and unexpected weight change. HENT:  Negative for congestion, ear pain, postnasal drip, sinus pressure, sore throat, tinnitus and trouble swallowing. Eyes:  Negative for pain and redness. Respiratory:  Negative for cough, chest tightness, shortness of breath and wheezing. Cardiovascular:  Negative for chest pain, palpitations and leg swelling.    Gastrointestinal:  Negative for abdominal distention, abdominal pain, blood in stool,

## 2023-10-20 ENCOUNTER — NURSE ONLY (OUTPATIENT)
Dept: PRIMARY CARE CLINIC | Age: 73
End: 2023-10-20
Payer: MEDICAID

## 2023-10-20 DIAGNOSIS — Z12.11 COLON CANCER SCREENING: ICD-10-CM

## 2023-10-20 LAB
CONTROL: REACTIVE
FECAL BLOOD IMMUNOCHEMICAL TEST: REACTIVE

## 2023-10-20 PROCEDURE — 82274 ASSAY TEST FOR BLOOD FECAL: CPT | Performed by: INTERNAL MEDICINE

## 2023-10-22 DIAGNOSIS — R79.89 LOW VITAMIN B12 LEVEL: Primary | ICD-10-CM

## 2023-10-22 DIAGNOSIS — R19.5 POSITIVE FIT (FECAL IMMUNOCHEMICAL TEST): ICD-10-CM

## 2023-10-22 DIAGNOSIS — Z12.11 COLON CANCER SCREENING: ICD-10-CM

## 2023-10-22 RX ORDER — CYANOCOBALAMIN 1000 UG/ML
1000 INJECTION, SOLUTION INTRAMUSCULAR; SUBCUTANEOUS
Qty: 1 ML | Refills: 5 | Status: SHIPPED | OUTPATIENT
Start: 2023-10-22

## 2023-10-23 DIAGNOSIS — R79.89 LOW VITAMIN B12 LEVEL: ICD-10-CM

## 2023-10-23 RX ORDER — CHOLECALCIFEROL (VITAMIN D3) 125 MCG
500 CAPSULE ORAL DAILY
Qty: 90 TABLET | Refills: 3 | Status: SHIPPED | OUTPATIENT
Start: 2023-10-23 | End: 2024-10-22

## 2023-11-08 DIAGNOSIS — F32.9 UNIPOLAR DEPRESSION: ICD-10-CM

## 2023-11-08 DIAGNOSIS — G47.00 INSOMNIA, UNSPECIFIED TYPE: ICD-10-CM

## 2023-11-08 DIAGNOSIS — Z79.4 TYPE 2 DIABETES MELLITUS WITHOUT COMPLICATION, WITH LONG-TERM CURRENT USE OF INSULIN (HCC): ICD-10-CM

## 2023-11-08 DIAGNOSIS — E11.9 TYPE 2 DIABETES MELLITUS WITHOUT COMPLICATION, WITH LONG-TERM CURRENT USE OF INSULIN (HCC): ICD-10-CM

## 2023-11-08 DIAGNOSIS — E11.69 HYPERLIPIDEMIA ASSOCIATED WITH TYPE 2 DIABETES MELLITUS (HCC): ICD-10-CM

## 2023-11-08 DIAGNOSIS — E78.5 HYPERLIPIDEMIA ASSOCIATED WITH TYPE 2 DIABETES MELLITUS (HCC): ICD-10-CM

## 2023-11-08 RX ORDER — BLOOD-GLUCOSE METER
EACH MISCELLANEOUS
Qty: 100 EACH | Refills: 5 | Status: SHIPPED | OUTPATIENT
Start: 2023-11-08

## 2023-11-08 RX ORDER — INSULIN ASPART 100 [IU]/ML
INJECTION, SOLUTION INTRAVENOUS; SUBCUTANEOUS
Qty: 9 ML | Refills: 5 | Status: SHIPPED | OUTPATIENT
Start: 2023-11-08

## 2023-11-08 RX ORDER — INSULIN GLARGINE 100 [IU]/ML
INJECTION, SOLUTION SUBCUTANEOUS
Qty: 3 ML | Refills: 0 | OUTPATIENT
Start: 2023-11-08

## 2023-11-08 RX ORDER — SIMVASTATIN 20 MG
TABLET ORAL
Qty: 30 TABLET | Refills: 5 | Status: SHIPPED | OUTPATIENT
Start: 2023-11-08

## 2023-11-08 RX ORDER — INSULIN GLARGINE 100 [IU]/ML
INJECTION, SOLUTION SUBCUTANEOUS
Qty: 9 ML | Refills: 5 | Status: SHIPPED | OUTPATIENT
Start: 2023-11-08

## 2023-11-08 RX ORDER — TRAZODONE HYDROCHLORIDE 50 MG/1
TABLET ORAL
Qty: 60 TABLET | Refills: 5 | Status: SHIPPED | OUTPATIENT
Start: 2023-11-08

## 2023-11-16 PROBLEM — Z00.00 ENCOUNTER FOR WELL ADULT EXAM WITHOUT ABNORMAL FINDINGS: Status: RESOLVED | Noted: 2022-10-26 | Resolved: 2023-11-16

## 2023-11-27 ENCOUNTER — HOSPITAL ENCOUNTER (EMERGENCY)
Age: 73
Discharge: HOME | End: 2023-11-27
Attending: EMERGENCY MEDICINE
Payer: MEDICAID

## 2023-11-27 ENCOUNTER — APPOINTMENT (OUTPATIENT)
Dept: GENERAL RADIOLOGY | Age: 73
End: 2023-11-27
Payer: MEDICAID

## 2023-11-27 VITALS
HEIGHT: 63 IN | BODY MASS INDEX: 26.22 KG/M2 | WEIGHT: 148 LBS | RESPIRATION RATE: 19 BRPM | OXYGEN SATURATION: 93 % | HEART RATE: 70 BPM | SYSTOLIC BLOOD PRESSURE: 136 MMHG | DIASTOLIC BLOOD PRESSURE: 85 MMHG | TEMPERATURE: 97.6 F

## 2023-11-27 DIAGNOSIS — R53.83 OTHER FATIGUE: Primary | ICD-10-CM

## 2023-11-27 LAB
ALBUMIN SERPL-MCNC: 4.2 G/DL (ref 3.4–5)
ALBUMIN/GLOB SERPL: 1.2 {RATIO} (ref 1.1–2.2)
ALP SERPL-CCNC: 103 U/L (ref 40–129)
ALT SERPL-CCNC: 16 U/L (ref 10–40)
ANION GAP SERPL CALCULATED.3IONS-SCNC: 12 MMOL/L (ref 3–16)
AST SERPL-CCNC: 18 U/L (ref 15–37)
BASOPHILS # BLD: 0.1 K/UL (ref 0–0.2)
BASOPHILS NFR BLD: 0.9 %
BILIRUB SERPL-MCNC: 0.5 MG/DL (ref 0–1)
BILIRUB UR QL STRIP.AUTO: NEGATIVE
BUN SERPL-MCNC: 22 MG/DL (ref 7–20)
CALCIUM SERPL-MCNC: 8.9 MG/DL (ref 8.3–10.6)
CHLORIDE SERPL-SCNC: 105 MMOL/L (ref 99–110)
CLARITY UR: CLEAR
CO2 SERPL-SCNC: 23 MMOL/L (ref 21–32)
COLOR UR: YELLOW
CREAT SERPL-MCNC: 0.9 MG/DL (ref 0.8–1.3)
DEPRECATED RDW RBC AUTO: 14.4 % (ref 12.4–15.4)
EKG ATRIAL RATE: 87 BPM
EKG DIAGNOSIS: NORMAL
EKG P AXIS: 52 DEGREES
EKG P-R INTERVAL: 166 MS
EKG Q-T INTERVAL: 394 MS
EKG QRS DURATION: 92 MS
EKG QTC CALCULATION (BAZETT): 474 MS
EKG R AXIS: 54 DEGREES
EKG T AXIS: 48 DEGREES
EKG VENTRICULAR RATE: 87 BPM
EOSINOPHIL # BLD: 0.3 K/UL (ref 0–0.6)
EOSINOPHIL NFR BLD: 3.4 %
FLUAV RNA RESP QL NAA+PROBE: NOT DETECTED
FLUBV RNA RESP QL NAA+PROBE: NOT DETECTED
GFR SERPLBLD CREATININE-BSD FMLA CKD-EPI: >60 ML/MIN/{1.73_M2}
GLUCOSE BLD-MCNC: 206 MG/DL (ref 70–99)
GLUCOSE SERPL-MCNC: 244 MG/DL (ref 70–99)
GLUCOSE UR STRIP.AUTO-MCNC: >=1000 MG/DL
HCT VFR BLD AUTO: 45.5 % (ref 40.5–52.5)
HGB BLD-MCNC: 14.9 G/DL (ref 13.5–17.5)
HGB UR QL STRIP.AUTO: NEGATIVE
KETONES UR STRIP.AUTO-MCNC: 15 MG/DL
LEUKOCYTE ESTERASE UR QL STRIP.AUTO: NEGATIVE
LIPASE SERPL-CCNC: 45 U/L (ref 13–60)
LYMPHOCYTES # BLD: 1.5 K/UL (ref 1–5.1)
LYMPHOCYTES NFR BLD: 18.1 %
MAGNESIUM SERPL-MCNC: 2.4 MG/DL (ref 1.8–2.4)
MCH RBC QN AUTO: 28.5 PG (ref 26–34)
MCHC RBC AUTO-ENTMCNC: 32.8 G/DL (ref 31–36)
MCV RBC AUTO: 86.7 FL (ref 80–100)
MONOCYTES # BLD: 0.8 K/UL (ref 0–1.3)
MONOCYTES NFR BLD: 9.6 %
NEUTROPHILS # BLD: 5.5 K/UL (ref 1.7–7.7)
NEUTROPHILS NFR BLD: 68 %
NITRITE UR QL STRIP.AUTO: NEGATIVE
NT-PROBNP SERPL-MCNC: 55 PG/ML (ref 0–124)
PERFORMED ON: ABNORMAL
PH UR STRIP.AUTO: 5.5 [PH] (ref 5–8)
PLATELET # BLD AUTO: 209 K/UL (ref 135–450)
PMV BLD AUTO: 9.4 FL (ref 5–10.5)
POTASSIUM SERPL-SCNC: 4.5 MMOL/L (ref 3.5–5.1)
PROT SERPL-MCNC: 7.7 G/DL (ref 6.4–8.2)
PROT UR STRIP.AUTO-MCNC: NEGATIVE MG/DL
RBC # BLD AUTO: 5.25 M/UL (ref 4.2–5.9)
SARS-COV-2 RNA RESP QL NAA+PROBE: NOT DETECTED
SODIUM SERPL-SCNC: 140 MMOL/L (ref 136–145)
SP GR UR STRIP.AUTO: >=1.03 (ref 1–1.03)
TROPONIN, HIGH SENSITIVITY: 12 NG/L (ref 0–22)
TROPONIN, HIGH SENSITIVITY: 12 NG/L (ref 0–22)
UA COMPLETE W REFLEX CULTURE PNL UR: ABNORMAL
UA DIPSTICK W REFLEX MICRO PNL UR: ABNORMAL
URN SPEC COLLECT METH UR: ABNORMAL
UROBILINOGEN UR STRIP-ACNC: 0.2 E.U./DL
WBC # BLD AUTO: 8 K/UL (ref 4–11)

## 2023-11-27 PROCEDURE — 83690 ASSAY OF LIPASE: CPT

## 2023-11-27 PROCEDURE — 85025 COMPLETE CBC W/AUTO DIFF WBC: CPT

## 2023-11-27 PROCEDURE — 71045 X-RAY EXAM CHEST 1 VIEW: CPT

## 2023-11-27 PROCEDURE — 83735 ASSAY OF MAGNESIUM: CPT

## 2023-11-27 PROCEDURE — 87636 SARSCOV2 & INF A&B AMP PRB: CPT

## 2023-11-27 PROCEDURE — 96360 HYDRATION IV INFUSION INIT: CPT

## 2023-11-27 PROCEDURE — 80053 COMPREHEN METABOLIC PANEL: CPT

## 2023-11-27 PROCEDURE — 93010 ELECTROCARDIOGRAM REPORT: CPT | Performed by: INTERNAL MEDICINE

## 2023-11-27 PROCEDURE — 84484 ASSAY OF TROPONIN QUANT: CPT

## 2023-11-27 PROCEDURE — 83880 ASSAY OF NATRIURETIC PEPTIDE: CPT

## 2023-11-27 PROCEDURE — 99285 EMERGENCY DEPT VISIT HI MDM: CPT

## 2023-11-27 PROCEDURE — 93005 ELECTROCARDIOGRAM TRACING: CPT | Performed by: PHYSICIAN ASSISTANT

## 2023-11-27 PROCEDURE — 81003 URINALYSIS AUTO W/O SCOPE: CPT

## 2023-11-27 PROCEDURE — 2580000003 HC RX 258: Performed by: PHYSICIAN ASSISTANT

## 2023-11-27 RX ORDER — 0.9 % SODIUM CHLORIDE 0.9 %
1000 INTRAVENOUS SOLUTION INTRAVENOUS ONCE
Status: COMPLETED | OUTPATIENT
Start: 2023-11-27 | End: 2023-11-27

## 2023-11-27 RX ADMIN — SODIUM CHLORIDE 1000 ML: 9 INJECTION, SOLUTION INTRAVENOUS at 13:21

## 2023-11-27 ASSESSMENT — ENCOUNTER SYMPTOMS
NAUSEA: 0
DIARRHEA: 0
SHORTNESS OF BREATH: 0
COUGH: 0
CHEST TIGHTNESS: 0
VOMITING: 0
ABDOMINAL PAIN: 0

## 2023-11-27 ASSESSMENT — PAIN - FUNCTIONAL ASSESSMENT: PAIN_FUNCTIONAL_ASSESSMENT: NONE - DENIES PAIN

## 2023-11-27 NOTE — ED PROVIDER NOTES
TO:  Aida Duran MD  [address] [place] [address]  [phone]    Schedule an appointment as soon as possible for a visit         DISCHARGE MEDICATIONS:  New Prescriptions    No medications on file       DISCONTINUED MEDICATIONS:  Discontinued Medications    No medications on file              (Please note that portions of this note were completed with a voice recognition program.  Efforts were made to edit the dictations but occasionally words are mis-transcribed.)    Humaira Ruano PA-C (electronically signed)           Humaira Ruano PA-C  11/27/23 1971

## 2023-11-27 NOTE — CONSULTS
Session ID: [id number]  Language: [facility]   ID: #[id number]   Name: Ana Paula Mari

## 2024-01-03 ENCOUNTER — TELEPHONE (OUTPATIENT)
Dept: PRIMARY CARE CLINIC | Age: 74
End: 2024-01-03

## 2024-01-03 NOTE — TELEPHONE ENCOUNTER
Patient's son called to get copies of the patient's medication list and diagnosis that support the medications. Patient will  in the office when ready.

## 2024-01-30 ENCOUNTER — OFFICE VISIT (OUTPATIENT)
Dept: PRIMARY CARE CLINIC | Age: 74
End: 2024-01-30
Payer: MEDICAID

## 2024-01-30 VITALS
TEMPERATURE: 97.2 F | WEIGHT: 151 LBS | BODY MASS INDEX: 25.78 KG/M2 | RESPIRATION RATE: 14 BRPM | SYSTOLIC BLOOD PRESSURE: 104 MMHG | DIASTOLIC BLOOD PRESSURE: 78 MMHG | HEIGHT: 64 IN | OXYGEN SATURATION: 98 % | HEART RATE: 92 BPM

## 2024-01-30 DIAGNOSIS — R47.81 SLURRED SPEECH: ICD-10-CM

## 2024-01-30 DIAGNOSIS — E11.9 TYPE 2 DIABETES MELLITUS WITHOUT COMPLICATION, WITH LONG-TERM CURRENT USE OF INSULIN (HCC): ICD-10-CM

## 2024-01-30 DIAGNOSIS — R41.89 PERSISTENT COGNITIVE IMPAIRMENT: ICD-10-CM

## 2024-01-30 DIAGNOSIS — R26.2 DIFFICULTY WALKING: ICD-10-CM

## 2024-01-30 DIAGNOSIS — E11.9 TYPE 2 DIABETES MELLITUS WITHOUT COMPLICATION, WITH LONG-TERM CURRENT USE OF INSULIN (HCC): Primary | ICD-10-CM

## 2024-01-30 DIAGNOSIS — E11.69 HYPERLIPIDEMIA ASSOCIATED WITH TYPE 2 DIABETES MELLITUS (HCC): ICD-10-CM

## 2024-01-30 DIAGNOSIS — E78.5 HYPERLIPIDEMIA ASSOCIATED WITH TYPE 2 DIABETES MELLITUS (HCC): ICD-10-CM

## 2024-01-30 DIAGNOSIS — Z79.4 TYPE 2 DIABETES MELLITUS WITHOUT COMPLICATION, WITH LONG-TERM CURRENT USE OF INSULIN (HCC): Primary | ICD-10-CM

## 2024-01-30 DIAGNOSIS — Z79.4 TYPE 2 DIABETES MELLITUS WITHOUT COMPLICATION, WITH LONG-TERM CURRENT USE OF INSULIN (HCC): ICD-10-CM

## 2024-01-30 DIAGNOSIS — F32.9 UNIPOLAR DEPRESSION: ICD-10-CM

## 2024-01-30 DIAGNOSIS — R53.83 OTHER FATIGUE: ICD-10-CM

## 2024-01-30 LAB
ALBUMIN SERPL-MCNC: 4.2 G/DL (ref 3.4–5)
ALBUMIN/GLOB SERPL: 1.3 {RATIO} (ref 1.1–2.2)
ALP SERPL-CCNC: 82 U/L (ref 40–129)
ALT SERPL-CCNC: 27 U/L (ref 10–40)
ANION GAP SERPL CALCULATED.3IONS-SCNC: 10 MMOL/L (ref 3–16)
AST SERPL-CCNC: 24 U/L (ref 15–37)
BILIRUB SERPL-MCNC: 0.4 MG/DL (ref 0–1)
BUN SERPL-MCNC: 14 MG/DL (ref 7–20)
CALCIUM SERPL-MCNC: 8.9 MG/DL (ref 8.3–10.6)
CHLORIDE SERPL-SCNC: 106 MMOL/L (ref 99–110)
CO2 SERPL-SCNC: 24 MMOL/L (ref 21–32)
CREAT SERPL-MCNC: 0.7 MG/DL (ref 0.8–1.3)
GFR SERPLBLD CREATININE-BSD FMLA CKD-EPI: >60 ML/MIN/{1.73_M2}
GLUCOSE SERPL-MCNC: 91 MG/DL (ref 70–99)
HBA1C MFR BLD: 8.1 %
POTASSIUM SERPL-SCNC: 4.7 MMOL/L (ref 3.5–5.1)
PROT SERPL-MCNC: 7.4 G/DL (ref 6.4–8.2)
SODIUM SERPL-SCNC: 140 MMOL/L (ref 136–145)

## 2024-01-30 PROCEDURE — 1123F ACP DISCUSS/DSCN MKR DOCD: CPT | Performed by: INTERNAL MEDICINE

## 2024-01-30 PROCEDURE — 83036 HEMOGLOBIN GLYCOSYLATED A1C: CPT | Performed by: INTERNAL MEDICINE

## 2024-01-30 PROCEDURE — 3052F HG A1C>EQUAL 8.0%<EQUAL 9.0%: CPT | Performed by: INTERNAL MEDICINE

## 2024-01-30 PROCEDURE — 99214 OFFICE O/P EST MOD 30 MIN: CPT | Performed by: INTERNAL MEDICINE

## 2024-01-30 RX ORDER — INSULIN GLARGINE 100 [IU]/ML
15 INJECTION, SOLUTION SUBCUTANEOUS NIGHTLY
Qty: 9 ML | Refills: 5 | Status: SHIPPED | OUTPATIENT
Start: 2024-01-30

## 2024-01-30 RX ORDER — INSULIN ASPART 100 [IU]/ML
INJECTION, SOLUTION INTRAVENOUS; SUBCUTANEOUS
Qty: 9 ML | Refills: 5 | Status: SHIPPED | OUTPATIENT
Start: 2024-01-30

## 2024-01-30 RX ORDER — METFORMIN HYDROCHLORIDE 500 MG/1
500 TABLET, EXTENDED RELEASE ORAL 2 TIMES DAILY WITH MEALS
Qty: 60 TABLET | Refills: 5 | Status: SHIPPED | OUTPATIENT
Start: 2024-01-30

## 2024-01-30 RX ORDER — DONEPEZIL HYDROCHLORIDE 5 MG/1
5 TABLET, FILM COATED ORAL NIGHTLY
Qty: 30 TABLET | Refills: 5 | Status: SHIPPED | OUTPATIENT
Start: 2024-01-30

## 2024-01-30 ASSESSMENT — ENCOUNTER SYMPTOMS
TROUBLE SWALLOWING: 0
ABDOMINAL DISTENTION: 0
BACK PAIN: 0
COLOR CHANGE: 0
EYE REDNESS: 0
BLOOD IN STOOL: 0
CONSTIPATION: 0
VOMITING: 0
DIARRHEA: 0
CHEST TIGHTNESS: 0
NAUSEA: 0
ABDOMINAL PAIN: 0
SINUS PRESSURE: 0
SORE THROAT: 0
WHEEZING: 0
COUGH: 0
SHORTNESS OF BREATH: 0
EYE PAIN: 0

## 2024-01-30 ASSESSMENT — PATIENT HEALTH QUESTIONNAIRE - PHQ9
2. FEELING DOWN, DEPRESSED OR HOPELESS: 0
SUM OF ALL RESPONSES TO PHQ9 QUESTIONS 1 & 2: 0
SUM OF ALL RESPONSES TO PHQ QUESTIONS 1-9: 0
10. IF YOU CHECKED OFF ANY PROBLEMS, HOW DIFFICULT HAVE THESE PROBLEMS MADE IT FOR YOU TO DO YOUR WORK, TAKE CARE OF THINGS AT HOME, OR GET ALONG WITH OTHER PEOPLE: 0
6. FEELING BAD ABOUT YOURSELF - OR THAT YOU ARE A FAILURE OR HAVE LET YOURSELF OR YOUR FAMILY DOWN: 0
SUM OF ALL RESPONSES TO PHQ QUESTIONS 1-9: 0
3. TROUBLE FALLING OR STAYING ASLEEP: 0
5. POOR APPETITE OR OVEREATING: 0
8. MOVING OR SPEAKING SO SLOWLY THAT OTHER PEOPLE COULD HAVE NOTICED. OR THE OPPOSITE, BEING SO FIGETY OR RESTLESS THAT YOU HAVE BEEN MOVING AROUND A LOT MORE THAN USUAL: 0
4. FEELING TIRED OR HAVING LITTLE ENERGY: 0
9. THOUGHTS THAT YOU WOULD BE BETTER OFF DEAD, OR OF HURTING YOURSELF: 0
7. TROUBLE CONCENTRATING ON THINGS, SUCH AS READING THE NEWSPAPER OR WATCHING TELEVISION: 0
SUM OF ALL RESPONSES TO PHQ QUESTIONS 1-9: 0
SUM OF ALL RESPONSES TO PHQ QUESTIONS 1-9: 0
1. LITTLE INTEREST OR PLEASURE IN DOING THINGS: 0

## 2024-01-30 NOTE — ASSESSMENT & PLAN NOTE
Patient scored 16/30 on Mini-Mental exam  Not currently on Aricept/Namenda  Worsening cognitive decline was likely due to hypoglycemic episodes.  However patient/POA agreed to start Aricept 5 mg nightly to help with delay cognitive decline.  Son would like brain MRI to assess memory decline

## 2024-01-30 NOTE — ASSESSMENT & PLAN NOTE
consolation of symptoms slurred speech, difficulty walking, likely from hypoglycemia.  Son requested brain MRI as part of dementia workup

## 2024-01-30 NOTE — ASSESSMENT & PLAN NOTE
consolation of symptoms slurred speech, difficulty walking, likely from hypoglycemia.  Son requested brain MRI as part of dementia workup  Offered referral to physical therapy, son would prefer to adjust diabetes meds and see how he does before pursuing therapy

## 2024-01-30 NOTE — ASSESSMENT & PLAN NOTE
A1C:   Lab Results   Component Value Date/Time    LABA1C 8.1 01/30/2024 11:47 AM    LABA1C 9.5 10/17/2023 10:59 AM    LABA1C 8.3 07/26/2023 09:21 AM    LABA1C 6.9 02/21/2023 01:13 PM    LABA1C 7.3 10/26/2022 09:19 AM     A1C Increased   continue home blood sugar monitoring  Counselled on Diet and exercise with goal to achieve appropriate BMI  Patient having recent hypoglycemic episodes.  Heikead was called recently after patient fell and  passed out had blood sugar of 45.  Son mentions he has been waking up with blood sugar in the 50s.  Patient will hold Lantus for the next couple of nights until his FBS is in the 120s.  He will then reintroduce Lantus at reduced dose of 10-15 units nightly.  We will adjust mealtime aspart to a low-dose insulin sliding scale.  Metformin has been reduced to 500 mg twice daily.  Continue Jardiance 10 mg daily.  continue home foot care  Patient agreed with plan with verbal understanding

## 2024-01-30 NOTE — PROGRESS NOTES
Dayo King (1950) is a 73 y.o. male   Follow-up and Diabetes     General health:  This patient presents for check up and refills. The problem and medicine lists and chart were reviewed in detail. The patient has been worked up and treated for this/these condition/s and is compliant with taking the medication without any significant side effects. The patient's condition/s is/are chronic and unchanged and otherwise remains stable. Feels well with minor complaints.    Main Problem Review - T2DM -son mentioned that his father has been experiencing hypoglycemic episodes since starting Jardiance.  He says he had to call squad a week ago his father fell/passed out his blood sugar was 45.  He says patient has also been waking up blood sugars in the low 60s high 50s.  Patient has been complaining of weakness, fatigue with a change in his walking. Patient will like to know what the next step is and treatment options are.       Other problems review    1.  Memory changes -patient says memory changes have worsened since low blood sugar episodes.  He however mentions that prior to  patient starting Jardiance there was a significant decline in his memory and he would like his father  to be assessed for this.    2.  Slow speech/difficulty walking -patient  Mentions that his father has been having low blood sugar episodes resulting in slow speech difficulty walking.  Patient's son  denies any other focal signs or deficits.    3. HLD  -  Patient denies complaints or symptoms of chest pain/shortness of breath with exertion or at rest.  Patient mentions they are adherent with treatment.  Patient follows a low-fat diet, tries to exercise.     Health Maintenance    Annual retinal eye exam - Patient due  will need to schedule   Annual foot exam - will get in future  Annual Dentist visit - Patient due  will need to schedule   Tobacco smoking - NO  Alcohol Misuse - NO  Illicit Drug Use-  NO  Healthy Diet and physical activity -

## 2024-01-31 LAB — VIT B12 SERPL-MCNC: 250 PG/ML (ref 211–911)

## 2024-01-31 NOTE — RESULT ENCOUNTER NOTE
Please let patient know that his B12 is still low normal, please confirm whether patient is taking vitamin B12 tablets every day.  If he is,  we will have to switch to vitamin B12 shots because he is not having appropriate GI absorption of B12.    Kidney liver function within normal limits.

## 2024-02-01 ENCOUNTER — TELEPHONE (OUTPATIENT)
Dept: PRIMARY CARE CLINIC | Age: 74
End: 2024-02-01

## 2024-02-02 NOTE — TELEPHONE ENCOUNTER
Please let patient know that his B12 is still low normal, please confirm whether patient is taking vitamin B12 tablets every day.  If he is,  we will have to switch to vitamin B12 shots because he is not having appropriate GI absorption of B12.     Kidney liver function within normal limits.    Sent letter with instructions to patient.

## 2024-02-21 DIAGNOSIS — I10 BENIGN ESSENTIAL HTN: ICD-10-CM

## 2024-02-22 RX ORDER — LISINOPRIL 5 MG/1
TABLET ORAL
Qty: 30 TABLET | Refills: 5 | Status: SHIPPED | OUTPATIENT
Start: 2024-02-22

## 2024-05-01 ENCOUNTER — OFFICE VISIT (OUTPATIENT)
Dept: PRIMARY CARE CLINIC | Age: 74
End: 2024-05-01
Payer: MEDICAID

## 2024-05-01 VITALS
WEIGHT: 143 LBS | BODY MASS INDEX: 24.41 KG/M2 | RESPIRATION RATE: 14 BRPM | SYSTOLIC BLOOD PRESSURE: 124 MMHG | DIASTOLIC BLOOD PRESSURE: 84 MMHG | HEART RATE: 78 BPM | OXYGEN SATURATION: 98 % | HEIGHT: 64 IN

## 2024-05-01 DIAGNOSIS — E11.69 HYPERLIPIDEMIA ASSOCIATED WITH TYPE 2 DIABETES MELLITUS (HCC): ICD-10-CM

## 2024-05-01 DIAGNOSIS — E78.5 HYPERLIPIDEMIA ASSOCIATED WITH TYPE 2 DIABETES MELLITUS (HCC): ICD-10-CM

## 2024-05-01 DIAGNOSIS — E11.9 TYPE 2 DIABETES MELLITUS WITHOUT COMPLICATION, WITH LONG-TERM CURRENT USE OF INSULIN (HCC): Primary | ICD-10-CM

## 2024-05-01 DIAGNOSIS — E11.9 TYPE 2 DIABETES MELLITUS WITHOUT COMPLICATION, WITH LONG-TERM CURRENT USE OF INSULIN (HCC): ICD-10-CM

## 2024-05-01 DIAGNOSIS — R41.89 PERSISTENT COGNITIVE IMPAIRMENT: ICD-10-CM

## 2024-05-01 DIAGNOSIS — R53.83 OTHER FATIGUE: ICD-10-CM

## 2024-05-01 DIAGNOSIS — Z79.4 TYPE 2 DIABETES MELLITUS WITHOUT COMPLICATION, WITH LONG-TERM CURRENT USE OF INSULIN (HCC): Primary | ICD-10-CM

## 2024-05-01 DIAGNOSIS — Z79.4 TYPE 2 DIABETES MELLITUS WITHOUT COMPLICATION, WITH LONG-TERM CURRENT USE OF INSULIN (HCC): ICD-10-CM

## 2024-05-01 DIAGNOSIS — F32.9 UNIPOLAR DEPRESSION: ICD-10-CM

## 2024-05-01 LAB
HBA1C MFR BLD: 10.3 %
VIT B12 SERPL-MCNC: 321 PG/ML (ref 211–911)

## 2024-05-01 PROCEDURE — G2211 COMPLEX E/M VISIT ADD ON: HCPCS | Performed by: INTERNAL MEDICINE

## 2024-05-01 PROCEDURE — 99214 OFFICE O/P EST MOD 30 MIN: CPT | Performed by: INTERNAL MEDICINE

## 2024-05-01 PROCEDURE — 1123F ACP DISCUSS/DSCN MKR DOCD: CPT | Performed by: INTERNAL MEDICINE

## 2024-05-01 PROCEDURE — 3046F HEMOGLOBIN A1C LEVEL >9.0%: CPT | Performed by: INTERNAL MEDICINE

## 2024-05-01 PROCEDURE — 83036 HEMOGLOBIN GLYCOSYLATED A1C: CPT | Performed by: INTERNAL MEDICINE

## 2024-05-01 RX ORDER — DIPHENHYDRAMINE HYDROCHLORIDE 25 MG/1
CAPSULE, LIQUID FILLED ORAL
Qty: 1 KIT | Refills: 0 | Status: SHIPPED | OUTPATIENT
Start: 2024-05-01

## 2024-05-01 RX ORDER — LANCETS 30 GAUGE
1 EACH MISCELLANEOUS DAILY
Qty: 300 EACH | Refills: 5 | Status: SHIPPED | OUTPATIENT
Start: 2024-05-01

## 2024-05-01 RX ORDER — GLUCOSAMINE HCL/CHONDROITIN SU 500-400 MG
CAPSULE ORAL DAILY
Qty: 50 STRIP | Refills: 5 | Status: SHIPPED | OUTPATIENT
Start: 2024-05-01

## 2024-05-01 ASSESSMENT — ENCOUNTER SYMPTOMS
ABDOMINAL DISTENTION: 0
CHEST TIGHTNESS: 0
BACK PAIN: 0
SHORTNESS OF BREATH: 0
SORE THROAT: 0
CONSTIPATION: 0
EYE PAIN: 0
VOMITING: 0
WHEEZING: 0
ABDOMINAL PAIN: 0
DIARRHEA: 0
EYE REDNESS: 0
BLOOD IN STOOL: 0
TROUBLE SWALLOWING: 0
NAUSEA: 0
COUGH: 0
SINUS PRESSURE: 0
COLOR CHANGE: 0

## 2024-05-01 NOTE — ASSESSMENT & PLAN NOTE
A1C:   Lab Results   Component Value Date/Time    LABA1C 10.3 05/01/2024 08:37 AM    LABA1C 8.1 01/30/2024 11:47 AM    LABA1C 9.5 10/17/2023 10:59 AM    LABA1C 8.3 07/26/2023 09:21 AM    LABA1C 6.9 02/21/2023 01:13 PM     A1C Increased   continue home blood sugar monitoring  Counselled on Diet and exercise with goal to achieve appropriate BMI  Increase Jardiance 25 mg daily.  Continue metformin 500 mg twice daily    Patient denies h/o medullary thyroid cancer, thyroid nodules or Family h/o MEN syndrome  Will prescribe Trulicity  Informed patient of potential side effects and precautions to take while on medication including but NOT limited to pancreatitis, gall bladder disease, depressed mood/suicidal thoughts, constipation, nausea/vomiting/diarrhea, hair loss, thyroid nodule growth.  Patient will continue with current insulin regimen [id number] units mg at night, aspart per sliding scale.  Patient will likely have to come off the insulin as he goes up on Trulicity dose.    His son will be in close communication with us, he's  to let us know immediately if he has low blood sugar  continue home foot care  Patient agreed with plan with verbal understanding

## 2024-05-01 NOTE — ASSESSMENT & PLAN NOTE
Stable on  Aricept 5 mg nightly to help with delay cognitive decline.  Son would like brain MRI to assess memory decline

## 2024-05-01 NOTE — PROGRESS NOTES
14-Oct-2022 23:54 Normal pulses.      Heart sounds: Normal heart sounds. No murmur heard.     No gallop.   Pulmonary:      Effort: Pulmonary effort is normal.      Breath sounds: Normal breath sounds. No wheezing, rhonchi or rales.   Abdominal:      General: Abdomen is flat. There is no distension.   Musculoskeletal:         General: No swelling or tenderness. Normal range of motion.      Cervical back: No tenderness.   Lymphadenopathy:      Cervical: No cervical adenopathy.   Skin:     Findings: No erythema, lesion or rash.   Neurological:      General: No focal deficit present.      Mental Status: He is alert and oriented to person, place, and time. Mental status is at baseline.      Cranial Nerves: No cranial nerve deficit.      Motor: No weakness.   Psychiatric:         Mood and Affect: Mood normal.         Behavior: Behavior normal.         Thought Content: Thought content normal.         Judgment: Judgment normal.         ASSESSMENT/PLAN:  1. Type 2 diabetes mellitus without complication, with long-term current use of insulin ([facility])  Assessment & Plan:        A1C:   Lab Results   Component Value Date/Time    LABA1C 10.3 05/01/2024 08:37 AM    LABA1C 8.1 01/30/2024 11:47 AM    LABA1C 9.5 10/17/2023 10:59 AM    LABA1C 8.3 07/26/2023 09:21 AM    LABA1C 6.9 02/21/2023 01:13 PM     A1C Increased   continue home blood sugar monitoring  Counselled on Diet and exercise with goal to achieve appropriate BMI  Increase Jardiance 25 mg daily.  Continue metformin 500 mg twice daily    Patient denies h/o medullary thyroid cancer, thyroid nodules or Family h/o MEN syndrome  Will prescribe Trulicity  Informed patient of potential side effects and precautions to take while on medication including but NOT limited to pancreatitis, gall bladder disease, depressed mood/suicidal thoughts, constipation, nausea/vomiting/diarrhea, hair loss, thyroid nodule growth.  Patient will continue with current insulin regimen [id number] units mg at night, aspart per

## 2024-05-02 LAB — REAGIN+T PALLIDUM IGG+IGM SERPL-IMP: NORMAL

## 2024-05-02 NOTE — RESULT ENCOUNTER NOTE
Please let patient know B12 has improved we will continue with medication.  Remind them to let us know if he has any low blood sugar episodes since we have changed his medication.

## 2024-07-03 ENCOUNTER — OFFICE VISIT (OUTPATIENT)
Dept: PRIMARY CARE CLINIC | Age: 74
End: 2024-07-03
Payer: MEDICAID

## 2024-07-03 VITALS
HEIGHT: 64 IN | RESPIRATION RATE: 16 BRPM | WEIGHT: 144 LBS | HEART RATE: 86 BPM | SYSTOLIC BLOOD PRESSURE: 104 MMHG | BODY MASS INDEX: 24.59 KG/M2 | DIASTOLIC BLOOD PRESSURE: 68 MMHG | OXYGEN SATURATION: 92 %

## 2024-07-03 DIAGNOSIS — E78.5 HYPERLIPIDEMIA ASSOCIATED WITH TYPE 2 DIABETES MELLITUS (HCC): ICD-10-CM

## 2024-07-03 DIAGNOSIS — Z79.4 TYPE 2 DIABETES MELLITUS WITHOUT COMPLICATION, WITH LONG-TERM CURRENT USE OF INSULIN (HCC): Primary | ICD-10-CM

## 2024-07-03 DIAGNOSIS — R41.89 PERSISTENT COGNITIVE IMPAIRMENT: ICD-10-CM

## 2024-07-03 DIAGNOSIS — F32.9 UNIPOLAR DEPRESSION: ICD-10-CM

## 2024-07-03 DIAGNOSIS — E11.69 HYPERLIPIDEMIA ASSOCIATED WITH TYPE 2 DIABETES MELLITUS (HCC): ICD-10-CM

## 2024-07-03 DIAGNOSIS — E11.9 TYPE 2 DIABETES MELLITUS WITHOUT COMPLICATION, WITH LONG-TERM CURRENT USE OF INSULIN (HCC): Primary | ICD-10-CM

## 2024-07-03 LAB — HBA1C MFR BLD: 8.3 %

## 2024-07-03 PROCEDURE — 99214 OFFICE O/P EST MOD 30 MIN: CPT | Performed by: INTERNAL MEDICINE

## 2024-07-03 PROCEDURE — 83036 HEMOGLOBIN GLYCOSYLATED A1C: CPT | Performed by: INTERNAL MEDICINE

## 2024-07-03 PROCEDURE — 3052F HG A1C>EQUAL 8.0%<EQUAL 9.0%: CPT | Performed by: INTERNAL MEDICINE

## 2024-07-03 PROCEDURE — 1123F ACP DISCUSS/DSCN MKR DOCD: CPT | Performed by: INTERNAL MEDICINE

## 2024-07-03 RX ORDER — SIMVASTATIN 20 MG
20 TABLET ORAL NIGHTLY
Qty: 30 TABLET | Refills: 11 | Status: SHIPPED | OUTPATIENT
Start: 2024-07-03

## 2024-07-03 SDOH — ECONOMIC STABILITY: FOOD INSECURITY: WITHIN THE PAST 12 MONTHS, YOU WORRIED THAT YOUR FOOD WOULD RUN OUT BEFORE YOU GOT MONEY TO BUY MORE.: NEVER TRUE

## 2024-07-03 SDOH — ECONOMIC STABILITY: FOOD INSECURITY: WITHIN THE PAST 12 MONTHS, THE FOOD YOU BOUGHT JUST DIDN'T LAST AND YOU DIDN'T HAVE MONEY TO GET MORE.: NEVER TRUE

## 2024-07-03 SDOH — ECONOMIC STABILITY: INCOME INSECURITY: HOW HARD IS IT FOR YOU TO PAY FOR THE VERY BASICS LIKE FOOD, HOUSING, MEDICAL CARE, AND HEATING?: NOT HARD AT ALL

## 2024-07-03 ASSESSMENT — ENCOUNTER SYMPTOMS
WHEEZING: 0
CHEST TIGHTNESS: 0
SHORTNESS OF BREATH: 0
COUGH: 0
DIARRHEA: 0
VOMITING: 0
SORE THROAT: 0
ABDOMINAL DISTENTION: 0
COLOR CHANGE: 0
EYE PAIN: 0
CONSTIPATION: 0
NAUSEA: 0
BACK PAIN: 0
EYE REDNESS: 0
TROUBLE SWALLOWING: 0
SINUS PRESSURE: 0
BLOOD IN STOOL: 0
ABDOMINAL PAIN: 0

## 2024-07-03 NOTE — ASSESSMENT & PLAN NOTE
A1C:   Lab Results   Component Value Date/Time    LABA1C 8.3 07/03/2024 03:34 PM    LABA1C 10.3 05/01/2024 08:37 AM    LABA1C 8.1 01/30/2024 11:47 AM    LABA1C 9.5 10/17/2023 10:59 AM    LABA1C 8.3 07/26/2023 09:21 AM     A1C Increased  Patient is a brittle diabetic spikes high but may drop low in the morning.  However patient never decrease dose of Lantus to 15 units he has been administering 25 units.  Patient never started Trulicity shots.  We will decrease Lantus to 10 units, start Trulicity 0.75 mg  Goal would be to stop the Lantus completely   continue home blood sugar monitoring  Counselled on Diet and exercise with goal to achieve appropriate BMI  On Jardiance 25 mg daily.  Continue metformin 500 mg twice daily    Patient denies h/o medullary thyroid cancer, thyroid nodules or Family h/o MEN syndrome  Informed patient of potential side effects and precautions to take while on medication including but NOT limited to pancreatitis, gall bladder disease, depressed mood/suicidal thoughts, constipation, nausea/vomiting/diarrhea, hair loss, thyroid nodule growth.  Patient will continue with current insulin regimen [id number] units mg at night, aspart per sliding scale.  Patient will likely have to come off the insulin as he goes up on Trulicity dose.    His son will be in close communication with us, he's  to let us know immediately if he has low blood sugar  continue home foot care  Patient agreed with plan with verbal understanding

## 2024-07-03 NOTE — PROGRESS NOTES
regular rhythm.      Pulses: Normal pulses.      Heart sounds: Normal heart sounds. No murmur heard.     No gallop.   Pulmonary:      Effort: Pulmonary effort is normal.      Breath sounds: Normal breath sounds. No wheezing, rhonchi or rales.   Abdominal:      General: Abdomen is flat. There is no distension.   Musculoskeletal:         General: No swelling or tenderness. Normal range of motion.      Cervical back: No tenderness.   Lymphadenopathy:      Cervical: No cervical adenopathy.   Skin:     Findings: No erythema, lesion or rash.   Neurological:      General: No focal deficit present.      Mental Status: He is alert and oriented to person, place, and time. Mental status is at baseline.      Cranial Nerves: No cranial nerve deficit.      Motor: No weakness.   Psychiatric:         Mood and Affect: Mood normal.         Behavior: Behavior normal.         Thought Content: Thought content normal.         Judgment: Judgment normal.         ASSESSMENT/PLAN:  1. Type 2 diabetes mellitus without complication, with long-term current use of insulin ([facility])  Assessment & Plan:        A1C:   Lab Results   Component Value Date/Time    LABA1C 8.3 07/03/2024 03:34 PM    LABA1C 10.3 05/01/2024 08:37 AM    LABA1C 8.1 01/30/2024 11:47 AM    LABA1C 9.5 10/17/2023 10:59 AM    LABA1C 8.3 07/26/2023 09:21 AM     A1C Increased  Patient is a brittle diabetic spikes high but may drop low in the morning.  However patient never decrease dose of Lantus to 15 units he has been administering 25 units.  Patient never started Trulicity shots.  We will decrease Lantus to 10 units, start Trulicity 0.75 mg  Goal would be to stop the Lantus completely   continue home blood sugar monitoring  Counselled on Diet and exercise with goal to achieve appropriate BMI  On Jardiance 25 mg daily.  Continue metformin 500 mg twice daily    Patient denies h/o medullary thyroid cancer, thyroid nodules or Family h/o MEN syndrome  Informed patient of potential side

## 2024-07-31 ENCOUNTER — OFFICE VISIT (OUTPATIENT)
Dept: PRIMARY CARE CLINIC | Age: 74
End: 2024-07-31
Payer: MEDICAID

## 2024-07-31 VITALS
OXYGEN SATURATION: 94 % | RESPIRATION RATE: 14 BRPM | WEIGHT: 140.6 LBS | SYSTOLIC BLOOD PRESSURE: 102 MMHG | HEIGHT: 64 IN | TEMPERATURE: 97.6 F | HEART RATE: 105 BPM | BODY MASS INDEX: 24.01 KG/M2 | DIASTOLIC BLOOD PRESSURE: 72 MMHG

## 2024-07-31 DIAGNOSIS — Z79.4 TYPE 2 DIABETES MELLITUS WITHOUT COMPLICATION, WITH LONG-TERM CURRENT USE OF INSULIN (HCC): Primary | ICD-10-CM

## 2024-07-31 DIAGNOSIS — E11.9 TYPE 2 DIABETES MELLITUS WITHOUT COMPLICATION, WITH LONG-TERM CURRENT USE OF INSULIN (HCC): Primary | ICD-10-CM

## 2024-07-31 DIAGNOSIS — E78.5 HYPERLIPIDEMIA ASSOCIATED WITH TYPE 2 DIABETES MELLITUS (HCC): ICD-10-CM

## 2024-07-31 DIAGNOSIS — E11.69 HYPERLIPIDEMIA ASSOCIATED WITH TYPE 2 DIABETES MELLITUS (HCC): ICD-10-CM

## 2024-07-31 DIAGNOSIS — F32.9 UNIPOLAR DEPRESSION: ICD-10-CM

## 2024-07-31 DIAGNOSIS — R41.89 PERSISTENT COGNITIVE IMPAIRMENT: ICD-10-CM

## 2024-07-31 PROCEDURE — 1123F ACP DISCUSS/DSCN MKR DOCD: CPT | Performed by: INTERNAL MEDICINE

## 2024-07-31 PROCEDURE — 3052F HG A1C>EQUAL 8.0%<EQUAL 9.0%: CPT | Performed by: INTERNAL MEDICINE

## 2024-07-31 PROCEDURE — 99214 OFFICE O/P EST MOD 30 MIN: CPT | Performed by: INTERNAL MEDICINE

## 2024-07-31 ASSESSMENT — ENCOUNTER SYMPTOMS
CHEST TIGHTNESS: 0
BACK PAIN: 0
DIARRHEA: 0
ABDOMINAL PAIN: 0
COLOR CHANGE: 0
TROUBLE SWALLOWING: 0
SINUS PRESSURE: 0
ABDOMINAL DISTENTION: 0
WHEEZING: 0
CONSTIPATION: 0
SORE THROAT: 0
VOMITING: 0
COUGH: 0
EYE PAIN: 0
BLOOD IN STOOL: 0
SHORTNESS OF BREATH: 0
NAUSEA: 0
EYE REDNESS: 0

## 2024-07-31 NOTE — ASSESSMENT & PLAN NOTE
A1C:   Lab Results   Component Value Date/Time    LABA1C 8.3 07/03/2024 03:34 PM    LABA1C 10.3 05/01/2024 08:37 AM    LABA1C 8.1 01/30/2024 11:47 AM    LABA1C 9.5 10/17/2023 10:59 AM    LABA1C 8.3 07/26/2023 09:21 AM     A1C Increased  Patient is a brittle diabetic spikes high but may drop low in the morning.  However patient never decrease dose of Lantus to 15 units he has been administering 25 units.  Patient never started Trulicity shots.  On Lantus to 10 units, on  Trulicity 0.75 mg,  Will increase Trulicity to 1.5 mg weekly plan will be to discontinue Lantus    Goal would be to stop the Lantus completely   continue home blood sugar monitoring  Counselled on Diet and exercise with goal to achieve appropriate BMI  On Jardiance 25 mg daily.  Continue metformin 500 mg twice daily    Patient denies h/o medullary thyroid cancer, thyroid nodules or Family h/o MEN syndrome  Informed patient of potential side effects and precautions to take while on medication including but NOT limited to pancreatitis, gall bladder disease, depressed mood/suicidal thoughts, constipation, nausea/vomiting/diarrhea, hair loss, thyroid nodule growth.  Patient will continue with current insulin regimen [id number] units mg at night, aspart per sliding scale.  Patient will likely have to come off the insulin as he goes up on Trulicity dose.    His son will be in close communication with us, he's  to let us know immediately if he has low blood sugar  continue home foot care  Patient agreed with plan with verbal understanding

## 2024-07-31 NOTE — PROGRESS NOTES
Dayo King (1950) is a 74 y.o. male   Follow-up     General health:  This patient presents for check up and refills. The problem and medicine lists and chart were reviewed in detail. The patient has been worked up and treated for this/these condition/s and is compliant with taking the medication without any significant side effects. The patient's condition/s is/are chronic and unchanged and otherwise remains stable. Feels well with minor complaints.    Main Problem Review -T2DM -patient no longer having hypoglycemic episodes.  Son says fasting blood sugars 130s-140s.  Son said patient is adherent with his medication.    Other problems review    1.  HLD  -  Patient denies complaints or symptoms of chest pain/shortness of breath with exertion or at rest.  Patient mentions they are adherent with treatment.  Patient follows a low-fat diet, tries to exercise.      2. Memory changes -stable patient taking Aricept.        3.  Depression -patient son  denies crying or sad mood in the patient. Patient adherent with lexapro.     Health Maintenance    Annual retinal eye exam - Patient due  will need to schedule   Annual foot exam - will get in future  Annual Dentist visit - Patient due  will need to schedule   Tobacco smoking - NO  Alcohol Misuse - NO  Illicit Drug Use-  NO  Healthy Diet and physical activity -  YES  Obesity Screen- screened 10/14/22  Wears seat belt- YES  End of life directives discussed with patient.-Mentions he does not have  a will/or/an advanced directives.Was instructed to start process looking into preparing his will an advanced directives.  The ASCVD Risk score (Holly GREENE, et al., 2019) failed to calculate for the following reasons:    The valid total cholesterol range is 130 to 320 mg/dL     Review of Systems   Constitutional:  Negative for activity change, appetite change, chills, fatigue, fever and unexpected weight change.   HENT:  Negative for congestion, ear pain, postnasal drip, sinus

## 2024-11-15 DIAGNOSIS — Z79.4 TYPE 2 DIABETES MELLITUS WITHOUT COMPLICATION, WITH LONG-TERM CURRENT USE OF INSULIN (HCC): ICD-10-CM

## 2024-11-15 DIAGNOSIS — I10 BENIGN ESSENTIAL HTN: ICD-10-CM

## 2024-11-15 DIAGNOSIS — F32.9 UNIPOLAR DEPRESSION: ICD-10-CM

## 2024-11-15 DIAGNOSIS — E11.9 TYPE 2 DIABETES MELLITUS WITHOUT COMPLICATION, WITH LONG-TERM CURRENT USE OF INSULIN (HCC): ICD-10-CM

## 2024-11-15 DIAGNOSIS — G47.00 INSOMNIA, UNSPECIFIED TYPE: ICD-10-CM

## 2024-11-15 RX ORDER — TRAZODONE HYDROCHLORIDE 50 MG/1
TABLET, FILM COATED ORAL
Qty: 90 TABLET | Refills: 5 | Status: SHIPPED | OUTPATIENT
Start: 2024-11-15

## 2024-11-15 RX ORDER — LISINOPRIL 5 MG/1
TABLET ORAL
Qty: 90 TABLET | Refills: 8 | Status: SHIPPED | OUTPATIENT
Start: 2024-11-15

## 2024-11-15 RX ORDER — INSULIN GLARGINE 100 [IU]/ML
INJECTION, SOLUTION SUBCUTANEOUS
Qty: 15 ML | Refills: 5 | Status: SHIPPED | OUTPATIENT
Start: 2024-11-15

## 2024-12-02 DIAGNOSIS — I10 BENIGN ESSENTIAL HTN: ICD-10-CM

## 2024-12-02 DIAGNOSIS — Z79.4 TYPE 2 DIABETES MELLITUS WITHOUT COMPLICATION, WITH LONG-TERM CURRENT USE OF INSULIN (HCC): ICD-10-CM

## 2024-12-02 DIAGNOSIS — E11.9 TYPE 2 DIABETES MELLITUS WITHOUT COMPLICATION, WITH LONG-TERM CURRENT USE OF INSULIN (HCC): ICD-10-CM

## 2024-12-02 RX ORDER — LISINOPRIL 5 MG/1
5 TABLET ORAL DAILY
Qty: 90 TABLET | Refills: 3 | Status: SHIPPED | OUTPATIENT
Start: 2024-12-02

## 2024-12-02 RX ORDER — ALCOHOL ANTISEPTIC PADS
1 PADS, MEDICATED (EA) TOPICAL DAILY
Qty: 100 EACH | Refills: 5 | Status: SHIPPED | OUTPATIENT
Start: 2024-12-02

## 2024-12-02 NOTE — TELEPHONE ENCOUNTER
Please send in patient's refill for dulaglutide (TRULICITY) 0.75 MG/0.5ML SOPN SC injection. Spoke to patient's son Delfino. He scheduled the patient for 12/9/24 @ 2:45pm but wasn't able to schedule his mom right afterward. When I looked at the schedule, The patient is scheduled for 30 minutes and there was just a 15 minute appointment available @ 3:15pm. I scheduled the mom in the 15 minute spot. Delfino sounded like he wanted you to see the both of them soon.

## 2024-12-09 ENCOUNTER — OFFICE VISIT (OUTPATIENT)
Dept: PRIMARY CARE CLINIC | Age: 74
End: 2024-12-09
Payer: MEDICAID

## 2024-12-09 VITALS
HEART RATE: 96 BPM | DIASTOLIC BLOOD PRESSURE: 72 MMHG | OXYGEN SATURATION: 92 % | SYSTOLIC BLOOD PRESSURE: 118 MMHG | HEIGHT: 64 IN | WEIGHT: 138 LBS | RESPIRATION RATE: 14 BRPM | TEMPERATURE: 98 F | BODY MASS INDEX: 23.56 KG/M2

## 2024-12-09 DIAGNOSIS — Z79.4 TYPE 2 DIABETES MELLITUS WITHOUT COMPLICATION, WITH LONG-TERM CURRENT USE OF INSULIN (HCC): Primary | ICD-10-CM

## 2024-12-09 DIAGNOSIS — E78.5 HYPERLIPIDEMIA ASSOCIATED WITH TYPE 2 DIABETES MELLITUS (HCC): ICD-10-CM

## 2024-12-09 DIAGNOSIS — R41.89 PERSISTENT COGNITIVE IMPAIRMENT: ICD-10-CM

## 2024-12-09 DIAGNOSIS — E11.69 HYPERLIPIDEMIA ASSOCIATED WITH TYPE 2 DIABETES MELLITUS (HCC): ICD-10-CM

## 2024-12-09 DIAGNOSIS — F32.9 UNIPOLAR DEPRESSION: ICD-10-CM

## 2024-12-09 DIAGNOSIS — E11.9 TYPE 2 DIABETES MELLITUS WITHOUT COMPLICATION, WITH LONG-TERM CURRENT USE OF INSULIN (HCC): Primary | ICD-10-CM

## 2024-12-09 PROCEDURE — 1123F ACP DISCUSS/DSCN MKR DOCD: CPT | Performed by: INTERNAL MEDICINE

## 2024-12-09 PROCEDURE — 99214 OFFICE O/P EST MOD 30 MIN: CPT | Performed by: INTERNAL MEDICINE

## 2024-12-09 PROCEDURE — 3052F HG A1C>EQUAL 8.0%<EQUAL 9.0%: CPT | Performed by: INTERNAL MEDICINE

## 2024-12-09 PROCEDURE — G2211 COMPLEX E/M VISIT ADD ON: HCPCS | Performed by: INTERNAL MEDICINE

## 2024-12-09 RX ORDER — DULAGLUTIDE 0.75 MG/.5ML
0.75 INJECTION, SOLUTION SUBCUTANEOUS WEEKLY
Qty: 4 ADJUSTABLE DOSE PRE-FILLED PEN SYRINGE | Refills: 0 | Status: SHIPPED | OUTPATIENT
Start: 2024-12-09

## 2024-12-09 ASSESSMENT — ENCOUNTER SYMPTOMS
EYE REDNESS: 0
NAUSEA: 0
COUGH: 0
DIARRHEA: 0
SORE THROAT: 0
COLOR CHANGE: 0
CHEST TIGHTNESS: 0
SHORTNESS OF BREATH: 0
ABDOMINAL PAIN: 0
EYE PAIN: 0
BACK PAIN: 0
SINUS PRESSURE: 0
BLOOD IN STOOL: 0
ABDOMINAL DISTENTION: 0
VOMITING: 0
CONSTIPATION: 0
TROUBLE SWALLOWING: 0
WHEEZING: 0

## 2024-12-09 NOTE — PROGRESS NOTES
[id number]     [facility]  #[id number]  
sinus pressure, sore throat, tinnitus and trouble swallowing.    Eyes:  Negative for pain and redness.   Respiratory:  Negative for cough, chest tightness, shortness of breath and wheezing.    Cardiovascular:  Negative for chest pain, palpitations and leg swelling.   Gastrointestinal:  Negative for abdominal distention, abdominal pain, blood in stool, constipation, diarrhea, nausea and vomiting.   Endocrine: Negative for cold intolerance, heat intolerance and polydipsia.   Genitourinary:  Negative for decreased urine volume, dysuria, flank pain, frequency, hematuria and urgency.   Musculoskeletal:  Negative for arthralgias, back pain, joint swelling, neck pain and neck stiffness.   Skin:  Negative for color change and rash.   Neurological:  Negative for dizziness, weakness, numbness and headaches.   Hematological:  Negative for adenopathy.   Psychiatric/Behavioral:  Negative for behavioral problems, sleep disturbance and suicidal ideas. The patient is not nervous/anxious.         /72 (Site: Left Upper Arm, Position: Sitting, Cuff Size: Medium Adult)   Pulse 96   Temp 98 °F (36.7 °C) (Infrared)   Resp 14   Ht 1.613 m (5' 3.5\")   Wt 62.6 kg (138 lb)   SpO2 92%   BMI 24.06 kg/m²    Physical Exam  Constitutional:       General: He is not in acute distress.     Appearance: Normal appearance. He is not ill-appearing.   HENT:      Head: Normocephalic and atraumatic.      Right Ear: Ear canal normal.      Left Ear: Ear canal normal.      Mouth/Throat:      Mouth: Mucous membranes are moist.      Pharynx: No oropharyngeal exudate or posterior oropharyngeal erythema.   Eyes:      Extraocular Movements: Extraocular movements intact.      Conjunctiva/sclera: Conjunctivae normal.      Pupils: Pupils are equal, round, and reactive to light.   Neck:      Vascular: No carotid bruit.   Cardiovascular:      Rate and Rhythm: Normal rate and regular rhythm.      Pulses: Normal pulses.      Heart sounds: Normal heart

## 2024-12-09 NOTE — ASSESSMENT & PLAN NOTE
A1C:   Lab Results   Component Value Date/Time    LABA1C 8.3 07/03/2024 03:34 PM    LABA1C 10.3 05/01/2024 08:37 AM    LABA1C 8.1 01/30/2024 11:47 AM    LABA1C 9.5 10/17/2023 10:59 AM    LABA1C 8.3 07/26/2023 09:21 AM     A1C Improved   Patient is a brittle diabetic spikes high but may drop low in the morning.  Restart  Trulicity 0.75 mg,  Will increase Trulicity to 1.5 mg weekly in a month with a  plan  to discontinue Lantus   continue home blood sugar monitoring  Counselled on Diet and exercise with goal to achieve appropriate BMI  On Jardiance 25 mg daily.  Continue metformin 500 mg twice daily    Patient denies h/o medullary thyroid cancer, thyroid nodules or Family h/o MEN syndrome  Informed patient of potential side effects and precautions to take while on medication including but NOT limited to pancreatitis, gall bladder disease, depressed mood/suicidal thoughts, constipation, nausea/vomiting/diarrhea, hair loss, thyroid nodule growth.  Patient will continue with current insulin regimen [id number] units mg at night, aspart per sliding scale.  Patient will likely have to come off the insulin as he goes up on Trulicity dose.    His son will be in close communication with us, he's  to let us know immediately if he has low blood sugar  continue home foot care  Patient agreed with plan with verbal understanding

## 2024-12-18 ENCOUNTER — TELEPHONE (OUTPATIENT)
Dept: PRIMARY CARE CLINIC | Age: 74
End: 2024-12-18

## 2024-12-18 NOTE — TELEPHONE ENCOUNTER
[facility] calling stating they need a RX for pull ups before they will pay for them.     Fax Number [phone]

## 2024-12-18 NOTE — TELEPHONE ENCOUNTER
Patient does not have incontinence documented as a diagnosis, we have never prescribed pull-ups.  He will have to schedule an appointment in 1/25 to document this diagnosis for him to be able to get a prescription for pull-ups

## 2024-12-19 NOTE — TELEPHONE ENCOUNTER
Spoke to patient's son, on HIPAA. I explained to Delfino that there was no documentation of incontinence in the patient's chart. Delfino made an appointment for his dad to see Dr. Kwok and discuss the incontinence.

## 2025-01-09 ENCOUNTER — TELEPHONE (OUTPATIENT)
Dept: PRIMARY CARE CLINIC | Age: 75
End: 2025-01-09

## 2025-01-09 DIAGNOSIS — E11.9 TYPE 2 DIABETES MELLITUS WITHOUT COMPLICATION, WITH LONG-TERM CURRENT USE OF INSULIN (HCC): Primary | ICD-10-CM

## 2025-01-09 DIAGNOSIS — Z79.4 TYPE 2 DIABETES MELLITUS WITHOUT COMPLICATION, WITH LONG-TERM CURRENT USE OF INSULIN (HCC): Primary | ICD-10-CM

## 2025-01-09 RX ORDER — DULAGLUTIDE 1.5 MG/.5ML
1.5 INJECTION, SOLUTION SUBCUTANEOUS WEEKLY
Qty: 2 ML | Refills: 5 | Status: SHIPPED | OUTPATIENT
Start: 2025-01-09

## 2025-01-09 NOTE — TELEPHONE ENCOUNTER
----- Message from Dr. Malcom Kwok MD sent at 1/9/2025 11:54 AM EST -----  Regarding: Trulicity  Please let patient's son know I sent in a prescription for the next dose of Trulicity 1.5 mg weekly with refills.  The pharmacy should let him know when it is available for pickup.

## 2025-01-13 ENCOUNTER — OFFICE VISIT (OUTPATIENT)
Dept: PRIMARY CARE CLINIC | Age: 75
End: 2025-01-13
Payer: MEDICAID

## 2025-01-13 VITALS
SYSTOLIC BLOOD PRESSURE: 102 MMHG | WEIGHT: 136.2 LBS | HEART RATE: 92 BPM | BODY MASS INDEX: 23.25 KG/M2 | TEMPERATURE: 98 F | HEIGHT: 64 IN | DIASTOLIC BLOOD PRESSURE: 58 MMHG | OXYGEN SATURATION: 89 % | RESPIRATION RATE: 14 BRPM

## 2025-01-13 DIAGNOSIS — E11.69 HYPERLIPIDEMIA ASSOCIATED WITH TYPE 2 DIABETES MELLITUS (HCC): ICD-10-CM

## 2025-01-13 DIAGNOSIS — E78.5 HYPERLIPIDEMIA ASSOCIATED WITH TYPE 2 DIABETES MELLITUS (HCC): ICD-10-CM

## 2025-01-13 DIAGNOSIS — Z12.11 COLON CANCER SCREENING: ICD-10-CM

## 2025-01-13 DIAGNOSIS — Z79.4 TYPE 2 DIABETES MELLITUS WITHOUT COMPLICATION, WITH LONG-TERM CURRENT USE OF INSULIN (HCC): ICD-10-CM

## 2025-01-13 DIAGNOSIS — R41.89 PERSISTENT COGNITIVE IMPAIRMENT: ICD-10-CM

## 2025-01-13 DIAGNOSIS — E11.9 TYPE 2 DIABETES MELLITUS WITHOUT COMPLICATION, WITH LONG-TERM CURRENT USE OF INSULIN (HCC): ICD-10-CM

## 2025-01-13 DIAGNOSIS — N39.46 MIXED INCONTINENCE: ICD-10-CM

## 2025-01-13 DIAGNOSIS — Z23 NEEDS FLU SHOT: ICD-10-CM

## 2025-01-13 DIAGNOSIS — E11.9 TYPE 2 DIABETES MELLITUS WITHOUT COMPLICATION, WITH LONG-TERM CURRENT USE OF INSULIN (HCC): Primary | ICD-10-CM

## 2025-01-13 DIAGNOSIS — E11.9 ENCOUNTER FOR DIABETIC FOOT EXAM (HCC): ICD-10-CM

## 2025-01-13 DIAGNOSIS — Z79.4 TYPE 2 DIABETES MELLITUS WITHOUT COMPLICATION, WITH LONG-TERM CURRENT USE OF INSULIN (HCC): Primary | ICD-10-CM

## 2025-01-13 LAB — HBA1C MFR BLD: 9.2 %

## 2025-01-13 PROCEDURE — 3046F HEMOGLOBIN A1C LEVEL >9.0%: CPT | Performed by: INTERNAL MEDICINE

## 2025-01-13 PROCEDURE — 83036 HEMOGLOBIN GLYCOSYLATED A1C: CPT | Performed by: INTERNAL MEDICINE

## 2025-01-13 PROCEDURE — 90653 IIV ADJUVANT VACCINE IM: CPT | Performed by: INTERNAL MEDICINE

## 2025-01-13 PROCEDURE — 1123F ACP DISCUSS/DSCN MKR DOCD: CPT | Performed by: INTERNAL MEDICINE

## 2025-01-13 PROCEDURE — 99214 OFFICE O/P EST MOD 30 MIN: CPT | Performed by: INTERNAL MEDICINE

## 2025-01-13 PROCEDURE — 90471 IMMUNIZATION ADMIN: CPT | Performed by: INTERNAL MEDICINE

## 2025-01-13 RX ORDER — METFORMIN HYDROCHLORIDE 500 MG/1
500 TABLET, EXTENDED RELEASE ORAL 2 TIMES DAILY WITH MEALS
Qty: 60 TABLET | Refills: 11 | Status: SHIPPED | OUTPATIENT
Start: 2025-01-13 | End: 2025-01-14 | Stop reason: SDUPTHER

## 2025-01-13 RX ORDER — INSULIN GLARGINE 100 [IU]/ML
20 INJECTION, SOLUTION SUBCUTANEOUS DAILY
Qty: 15 ML | Refills: 5 | Status: SHIPPED | OUTPATIENT
Start: 2025-01-13

## 2025-01-13 RX ORDER — GLIPIZIDE 5 MG/1
5 TABLET, FILM COATED, EXTENDED RELEASE ORAL DAILY
Qty: 30 TABLET | Refills: 5 | Status: SHIPPED | OUTPATIENT
Start: 2025-01-13

## 2025-01-13 RX ORDER — METFORMIN HYDROCHLORIDE 500 MG/1
500 TABLET, EXTENDED RELEASE ORAL 2 TIMES DAILY WITH MEALS
Qty: 60 TABLET | Refills: 5 | Status: CANCELLED | OUTPATIENT
Start: 2025-01-13

## 2025-01-13 ASSESSMENT — ENCOUNTER SYMPTOMS
BACK PAIN: 0
EYE REDNESS: 0
CONSTIPATION: 0
WHEEZING: 0
NAUSEA: 0
BLOOD IN STOOL: 0
EYE PAIN: 0
ABDOMINAL PAIN: 0
DIARRHEA: 0
SORE THROAT: 0
VOMITING: 0
SHORTNESS OF BREATH: 0
ABDOMINAL DISTENTION: 0
COLOR CHANGE: 0
CHEST TIGHTNESS: 0
TROUBLE SWALLOWING: 0
SINUS PRESSURE: 0
COUGH: 0

## 2025-01-13 NOTE — ASSESSMENT & PLAN NOTE
A1C:   Lab Results   Component Value Date/Time    LABA1C 8.3 07/03/2024 03:34 PM    LABA1C 10.3 05/01/2024 08:37 AM    LABA1C 8.1 01/30/2024 11:47 AM    LABA1C 9.5 10/17/2023 10:59 AM    LABA1C 8.3 07/26/2023 09:21 AM     A1C Improved   Patient is a brittle diabetic spikes high but may drop low in the morning.  Restart  Trulicity 0.75 mg,  Will increase Trulicity to 1.5 mg weekly in a month with a  plan  to discontinue Lantus   continue home blood sugar monitoring  Counselled on Diet and exercise with goal to achieve appropriate BMI  On Jardiance 25 mg daily.  Continue metformin 500 mg twice daily    Patient denies h/o medullary thyroid cancer, thyroid nodules or Family h/o MEN syndrome  Informed patient of potential side effects and precautions to take while on medication including but NOT limited to pancreatitis, gall bladder disease, depressed mood/suicidal thoughts, constipation, nausea/vomiting/diarrhea, hair loss, thyroid nodule growth.  Patient will continue with current insulin regimen [id number] units mg at night, aspart per sliding scale.  Patient will likely have to come off the insulin as he goes up on Trulicity dose.    His son will be in close communication with us, he's  to let us know immediately if he has low blood sugar  continue home foot care  Patient agreed with plan with verbal understanding

## 2025-01-13 NOTE — PROGRESS NOTES
Dayo King (1950) is a 74 y.o. male   Incontinence     General health:  This patient presents for check up and refills. The problem and medicine lists and chart were reviewed in detail. The patient has been worked up and treated for this/these condition/s and is compliant with taking the medication without any significant side effects. The patient's condition/s is/are chronic and unchanged and otherwise remains stable. Feels well with minor complaints.    Main Problem Review -T2DM -patient no longer having hypoglycemic episodes.  Son says fasting blood sugars 130s-140s.  Son said patient is adherent with his medication.    Other problems review    1.  HLD  -  Patient denies complaints or symptoms of chest pain/shortness of breath with exertion or at rest.  Patient mentions they are adherent with treatment.  Patient follows a low-fat diet, tries to exercise.      2. Memory changes -stable patient taking Aricept.       Health Maintenance    Annual retinal eye exam - Patient due  will need to schedule   Annual foot exam - will get in future  Annual Dentist visit - Patient due  will need to schedule   Tobacco smoking - NO  Alcohol Misuse - NO  Illicit Drug Use-  NO  Healthy Diet and physical activity -  YES  Obesity Screen- screened 10/14/22  Wears seat belt- YES  End of life directives discussed with patient.-Mentions he does not have  a will/or/an advanced directives.Was instructed to start process looking into preparing his will an advanced directives.    The ASCVD Risk score (Holly GREENE, et al., 2019) failed to calculate for the following reasons:    The valid total cholesterol range is 130 to 320 mg/dL     Review of Systems   Constitutional:  Negative for activity change, appetite change, chills, fatigue, fever and unexpected weight change.   HENT:  Negative for congestion, ear pain, postnasal drip, sinus pressure, sore throat, tinnitus and trouble swallowing.    Eyes:  Negative for pain and redness.

## 2025-01-14 DIAGNOSIS — Z79.4 TYPE 2 DIABETES MELLITUS WITHOUT COMPLICATION, WITH LONG-TERM CURRENT USE OF INSULIN (HCC): ICD-10-CM

## 2025-01-14 DIAGNOSIS — E11.69 HYPERLIPIDEMIA ASSOCIATED WITH TYPE 2 DIABETES MELLITUS (HCC): ICD-10-CM

## 2025-01-14 DIAGNOSIS — E11.9 TYPE 2 DIABETES MELLITUS WITHOUT COMPLICATION, WITH LONG-TERM CURRENT USE OF INSULIN (HCC): ICD-10-CM

## 2025-01-14 DIAGNOSIS — E78.5 HYPERLIPIDEMIA ASSOCIATED WITH TYPE 2 DIABETES MELLITUS (HCC): ICD-10-CM

## 2025-01-14 RX ORDER — METFORMIN HYDROCHLORIDE 500 MG/1
500 TABLET, EXTENDED RELEASE ORAL 2 TIMES DAILY WITH MEALS
Qty: 60 TABLET | Refills: 11 | Status: SHIPPED | OUTPATIENT
Start: 2025-01-14

## 2025-01-15 LAB
ALBUMIN SERPL-MCNC: 4.2 G/DL (ref 3.4–5)
ALBUMIN/GLOB SERPL: 1.3 {RATIO} (ref 1.1–2.2)
ALP SERPL-CCNC: 116 U/L (ref 40–129)
ALT SERPL-CCNC: 34 U/L (ref 10–40)
ANION GAP SERPL CALCULATED.3IONS-SCNC: 13 MMOL/L (ref 3–16)
AST SERPL-CCNC: 18 U/L (ref 15–37)
BILIRUB SERPL-MCNC: 0.4 MG/DL (ref 0–1)
BUN SERPL-MCNC: 20 MG/DL (ref 7–20)
CALCIUM SERPL-MCNC: 9.3 MG/DL (ref 8.3–10.6)
CHLORIDE SERPL-SCNC: 101 MMOL/L (ref 99–110)
CO2 SERPL-SCNC: 25 MMOL/L (ref 21–32)
CREAT SERPL-MCNC: 0.9 MG/DL (ref 0.8–1.3)
GFR SERPLBLD CREATININE-BSD FMLA CKD-EPI: >90 ML/MIN/{1.73_M2}
GLUCOSE SERPL-MCNC: 216 MG/DL (ref 70–99)
POTASSIUM SERPL-SCNC: 4.1 MMOL/L (ref 3.5–5.1)
PROT SERPL-MCNC: 7.5 G/DL (ref 6.4–8.2)
SODIUM SERPL-SCNC: 139 MMOL/L (ref 136–145)

## 2025-02-10 ENCOUNTER — OFFICE VISIT (OUTPATIENT)
Dept: PRIMARY CARE CLINIC | Age: 75
End: 2025-02-10

## 2025-02-10 VITALS
TEMPERATURE: 97 F | HEIGHT: 64 IN | WEIGHT: 136 LBS | SYSTOLIC BLOOD PRESSURE: 108 MMHG | DIASTOLIC BLOOD PRESSURE: 60 MMHG | HEART RATE: 87 BPM | OXYGEN SATURATION: 97 % | BODY MASS INDEX: 23.22 KG/M2 | RESPIRATION RATE: 14 BRPM

## 2025-02-10 DIAGNOSIS — Z79.4 TYPE 2 DIABETES MELLITUS WITHOUT COMPLICATION, WITH LONG-TERM CURRENT USE OF INSULIN (HCC): ICD-10-CM

## 2025-02-10 DIAGNOSIS — Z00.00 ENCOUNTER FOR WELL ADULT EXAM WITHOUT ABNORMAL FINDINGS: Primary | ICD-10-CM

## 2025-02-10 DIAGNOSIS — F32.9 UNIPOLAR DEPRESSION: ICD-10-CM

## 2025-02-10 DIAGNOSIS — E78.5 HYPERLIPIDEMIA ASSOCIATED WITH TYPE 2 DIABETES MELLITUS (HCC): ICD-10-CM

## 2025-02-10 DIAGNOSIS — Z00.00 ENCOUNTER FOR WELL ADULT EXAM WITHOUT ABNORMAL FINDINGS: ICD-10-CM

## 2025-02-10 DIAGNOSIS — E11.9 TYPE 2 DIABETES MELLITUS WITHOUT COMPLICATION, WITH LONG-TERM CURRENT USE OF INSULIN (HCC): ICD-10-CM

## 2025-02-10 DIAGNOSIS — Z12.11 COLON CANCER SCREENING: ICD-10-CM

## 2025-02-10 DIAGNOSIS — E11.69 HYPERLIPIDEMIA ASSOCIATED WITH TYPE 2 DIABETES MELLITUS (HCC): ICD-10-CM

## 2025-02-10 DIAGNOSIS — R53.83 OTHER FATIGUE: ICD-10-CM

## 2025-02-10 DIAGNOSIS — E55.9 VITAMIN D DEFICIENCY: ICD-10-CM

## 2025-02-10 DIAGNOSIS — R41.89 PERSISTENT COGNITIVE IMPAIRMENT: ICD-10-CM

## 2025-02-10 ASSESSMENT — ENCOUNTER SYMPTOMS
NAUSEA: 0
SORE THROAT: 0
COLOR CHANGE: 0
SHORTNESS OF BREATH: 0
VOMITING: 0
ABDOMINAL DISTENTION: 0
SINUS PRESSURE: 0
DIARRHEA: 0
CONSTIPATION: 0
COUGH: 0
TROUBLE SWALLOWING: 0
BLOOD IN STOOL: 0
WHEEZING: 0
ABDOMINAL PAIN: 0
CHEST TIGHTNESS: 0
EYE REDNESS: 0
BACK PAIN: 0
EYE PAIN: 0

## 2025-02-10 NOTE — PROGRESS NOTES
[id number]   
visit - Patient due  will need to schedule   Tobacco smoking - NO  Alcohol Misuse - NO  Illicit Drug Use-  NO  Healthy Diet and physical activity -  YES  Obesity Screen- screened 10/14/22  Wears seat belt- YES  End of life directives discussed with patient.-Mentions he does not have  a will/or/an advanced directives.Was instructed to start process looking into preparing his will an advanced directives.    Review of Systems   Constitutional:  Negative for activity change, appetite change, chills, fatigue, fever and unexpected weight change.   HENT:  Negative for congestion, ear pain, postnasal drip, sinus pressure, sore throat, tinnitus and trouble swallowing.    Eyes:  Negative for pain and redness.   Respiratory:  Negative for cough, chest tightness, shortness of breath and wheezing.    Cardiovascular:  Negative for chest pain, palpitations and leg swelling.   Gastrointestinal:  Negative for abdominal distention, abdominal pain, blood in stool, constipation, diarrhea, nausea and vomiting.   Endocrine: Negative for cold intolerance, heat intolerance and polydipsia.   Genitourinary:  Negative for decreased urine volume, dysuria, flank pain, frequency, hematuria and urgency.   Musculoskeletal:  Negative for arthralgias, back pain, joint swelling, neck pain and neck stiffness.   Skin:  Negative for color change and rash.   Neurological:  Negative for dizziness, weakness, numbness and headaches.   Hematological:  Negative for adenopathy.   Psychiatric/Behavioral:  Negative for behavioral problems, sleep disturbance and suicidal ideas. The patient is not nervous/anxious.        No Known Allergies  Prior to Visit Medications    Medication Sig Taking? Authorizing Provider   metFORMIN (GLUCOPHAGE-XR) 500 MG extended release tablet Take 1 tablet by mouth 2 times daily (with meals) Yes Malcom Kwok MD   glipiZIDE (GLUCOTROL XL) 5 MG extended release tablet Take 1 tablet by mouth daily Yes Malcom Kwok MD   insulin 
81yr old female presents w/ bileteral flank pain that radiates down her back, started today and gets worse when she takes a deep breath

## 2025-02-10 NOTE — PATIENT INSTRUCTIONS
Well Visit, Over 65: Care Instructions  Well visits can help you stay healthy. Your doctor has checked your overall health and may have suggested ways to take good care of yourself. Your doctor also may have recommended tests. You can help prevent illness with healthy eating, good sleep, vaccinations, regular exercise, and other steps.    Get the tests that you and your doctor decide on. Depending on your age and risks, examples might include hearing tests as well as screening for colon, breast, and lung cancer. Screening helps find diseases before any symptoms appear.   Eat healthy foods. Choose fruits, vegetables, whole grains, lean protein, and low-fat dairy foods. Limit saturated fat, and reduce salt.     Limit alcohol. Men should have no more than 2 drinks a day. Women should have no more than 1. For some people, no alcohol is the best choice.   Exercise. It can help prevent falls. Get at least 30 minutes of exercise on most days of the week. Walking, yoga, and oh chi can be good choices.     Reach and stay at your healthy weight. This will lower your risk for many health problems.   Take care of your mental health. Try to stay connected with friends, family, and community, and find ways to manage stress.     If you're feeling depressed or hopeless, talk to someone. A counselor can help. If you don't have a counselor, talk to your doctor.   Talk to your doctor if you think you may have a problem with alcohol or drug use. This includes prescription medicines and illegal drugs.     Avoid tobacco and nicotine: Don't smoke, vape, or chew. If you need help quitting, talk to your doctor.   Practice safer sex. Getting tested, using condoms or dental dams, and limiting sex partners can help prevent STIs.     Make an advance directive. This is a legal way to tell your family and doctor what you want to happen at the end of your life or when you can't speak for yourself.   Prevent problems where you can. Protect

## 2025-02-10 NOTE — ASSESSMENT & PLAN NOTE
A1C:   Lab Results   Component Value Date/Time    LABA1C 9.2 01/13/2025 02:57 PM    LABA1C 8.3 07/03/2024 03:34 PM    LABA1C 10.3 05/01/2024 08:37 AM    LABA1C 8.1 01/30/2024 11:47 AM    LABA1C 9.5 10/17/2023 10:59 AM     A1C got worse   Patient is a brittle diabetic spikes high but may drop low in the morning.  On Trulicity  1.5 mg weekly    continue home blood sugar monitoring  Counselled on Diet and exercise with goal to achieve appropriate BMI  On Jardiance 25 mg daily.  Continue metformin 500 mg twice daily    Patient denies h/o medullary thyroid cancer, thyroid nodules or Family h/o MEN syndrome  Informed patient of potential side effects and precautions to take while on medication including but NOT limited to pancreatitis, gall bladder disease, depressed mood/suicidal thoughts, constipation, nausea/vomiting/diarrhea, hair loss, thyroid nodule growth.  Patient will continue with current insulin regimen [id number] units mg at night, aspart per sliding scale.  Patient will likely have to come off the insulin as he goes up on Trulicity dose.    His son will be in close communication with us, he's  to let us know immediately if he has low blood sugar  continue home foot care  Patient agreed with plan with verbal understanding

## 2025-02-11 DIAGNOSIS — E55.9 VITAMIN D DEFICIENCY: ICD-10-CM

## 2025-02-11 DIAGNOSIS — E11.9 TYPE 2 DIABETES MELLITUS WITHOUT COMPLICATION, WITH LONG-TERM CURRENT USE OF INSULIN (HCC): Primary | ICD-10-CM

## 2025-02-11 DIAGNOSIS — Z79.4 TYPE 2 DIABETES MELLITUS WITHOUT COMPLICATION, WITH LONG-TERM CURRENT USE OF INSULIN (HCC): Primary | ICD-10-CM

## 2025-02-11 DIAGNOSIS — R79.89 LOW VITAMIN B12 LEVEL: ICD-10-CM

## 2025-02-11 LAB
25(OH)D3 SERPL-MCNC: 22.7 NG/ML
ALBUMIN SERPL-MCNC: 4.1 G/DL (ref 3.4–5)
ALBUMIN/GLOB SERPL: 1.4 {RATIO} (ref 1.1–2.2)
ALP SERPL-CCNC: 89 U/L (ref 40–129)
ALT SERPL-CCNC: 39 U/L (ref 10–40)
ANION GAP SERPL CALCULATED.3IONS-SCNC: 9 MMOL/L (ref 3–16)
AST SERPL-CCNC: 29 U/L (ref 15–37)
BASOPHILS # BLD: 0.1 K/UL (ref 0–0.2)
BASOPHILS NFR BLD: 1 %
BILIRUB SERPL-MCNC: 0.3 MG/DL (ref 0–1)
BUN SERPL-MCNC: 18 MG/DL (ref 7–20)
CALCIUM SERPL-MCNC: 9.5 MG/DL (ref 8.3–10.6)
CHLORIDE SERPL-SCNC: 103 MMOL/L (ref 99–110)
CHOLEST SERPL-MCNC: 176 MG/DL (ref 0–199)
CO2 SERPL-SCNC: 25 MMOL/L (ref 21–32)
CREAT SERPL-MCNC: 0.8 MG/DL (ref 0.8–1.3)
CREAT UR-MCNC: 99.8 MG/DL (ref 39–259)
DEPRECATED RDW RBC AUTO: 14.7 % (ref 12.4–15.4)
EOSINOPHIL # BLD: 0.4 K/UL (ref 0–0.6)
EOSINOPHIL NFR BLD: 5.8 %
EST. AVERAGE GLUCOSE BLD GHB EST-MCNC: 208.7 MG/DL
GFR SERPLBLD CREATININE-BSD FMLA CKD-EPI: >90 ML/MIN/{1.73_M2}
GLUCOSE SERPL-MCNC: 89 MG/DL (ref 70–99)
HBA1C MFR BLD: 8.9 %
HCT VFR BLD AUTO: 46.1 % (ref 40.5–52.5)
HDLC SERPL-MCNC: 46 MG/DL (ref 40–60)
HGB BLD-MCNC: 14.9 G/DL (ref 13.5–17.5)
LDLC SERPL CALC-MCNC: 95 MG/DL
LYMPHOCYTES # BLD: 1.8 K/UL (ref 1–5.1)
LYMPHOCYTES NFR BLD: 25.3 %
MCH RBC QN AUTO: 28.4 PG (ref 26–34)
MCHC RBC AUTO-ENTMCNC: 32.4 G/DL (ref 31–36)
MCV RBC AUTO: 87.6 FL (ref 80–100)
MICROALBUMIN UR DL<=1MG/L-MCNC: <1.2 MG/DL
MICROALBUMIN/CREAT UR: NORMAL MG/G (ref 0–30)
MONOCYTES # BLD: 0.5 K/UL (ref 0–1.3)
MONOCYTES NFR BLD: 7 %
NEUTROPHILS # BLD: 4.4 K/UL (ref 1.7–7.7)
NEUTROPHILS NFR BLD: 60.9 %
PLATELET # BLD AUTO: 181 K/UL (ref 135–450)
PMV BLD AUTO: 10.5 FL (ref 5–10.5)
POTASSIUM SERPL-SCNC: 4.3 MMOL/L (ref 3.5–5.1)
PROT SERPL-MCNC: 7 G/DL (ref 6.4–8.2)
RBC # BLD AUTO: 5.26 M/UL (ref 4.2–5.9)
SODIUM SERPL-SCNC: 137 MMOL/L (ref 136–145)
TRIGL SERPL-MCNC: 174 MG/DL (ref 0–150)
TSH SERPL DL<=0.005 MIU/L-ACNC: 0.93 UIU/ML (ref 0.27–4.2)
VIT B12 SERPL-MCNC: 269 PG/ML (ref 211–911)
VLDLC SERPL CALC-MCNC: 35 MG/DL
WBC # BLD AUTO: 7.1 K/UL (ref 4–11)

## 2025-02-11 RX ORDER — MULTIVITAMIN WITH IRON
500 TABLET ORAL DAILY
Qty: 90 TABLET | Refills: 3 | Status: SHIPPED | OUTPATIENT
Start: 2025-02-11 | End: 2026-02-11

## 2025-02-11 NOTE — RESULT ENCOUNTER NOTE
Please let patient know A1c 8.9.  I have gone up on his Trulicity to 3 mg weekly have given a couple refills.  It is  possible we may eventually need to go up to Trulicity 4.5 mg weekly the maximum dose.    Patient Vit D is low at  22.7. I have sent a prescription for vitamin D 2000 units with refills to their pharmacy.      I have also sent a refill for B12.  Please remind patient for vitamin D and B12 he has 3 refills, so after the 90 days are up please request a refill from the pharmacist.

## 2025-03-12 PROBLEM — Z00.00 ENCOUNTER FOR WELL ADULT EXAM WITHOUT ABNORMAL FINDINGS: Status: RESOLVED | Noted: 2022-10-26 | Resolved: 2025-03-12

## 2025-04-14 ENCOUNTER — OFFICE VISIT (OUTPATIENT)
Dept: PRIMARY CARE CLINIC | Age: 75
End: 2025-04-14
Payer: MEDICAID

## 2025-04-14 VITALS
RESPIRATION RATE: 16 BRPM | SYSTOLIC BLOOD PRESSURE: 112 MMHG | WEIGHT: 137 LBS | OXYGEN SATURATION: 99 % | HEIGHT: 63 IN | TEMPERATURE: 98 F | DIASTOLIC BLOOD PRESSURE: 80 MMHG | HEART RATE: 104 BPM | BODY MASS INDEX: 24.27 KG/M2

## 2025-04-14 DIAGNOSIS — E78.5 HYPERLIPIDEMIA ASSOCIATED WITH TYPE 2 DIABETES MELLITUS: ICD-10-CM

## 2025-04-14 DIAGNOSIS — E11.9 TYPE 2 DIABETES MELLITUS WITHOUT COMPLICATION, WITH LONG-TERM CURRENT USE OF INSULIN (HCC): ICD-10-CM

## 2025-04-14 DIAGNOSIS — E11.69 HYPERLIPIDEMIA ASSOCIATED WITH TYPE 2 DIABETES MELLITUS: ICD-10-CM

## 2025-04-14 DIAGNOSIS — Z79.4 TYPE 2 DIABETES MELLITUS WITHOUT COMPLICATION, WITH LONG-TERM CURRENT USE OF INSULIN: Primary | ICD-10-CM

## 2025-04-14 DIAGNOSIS — F32.9 UNIPOLAR DEPRESSION: ICD-10-CM

## 2025-04-14 DIAGNOSIS — E11.69 HYPERLIPIDEMIA ASSOCIATED WITH TYPE 2 DIABETES MELLITUS (HCC): ICD-10-CM

## 2025-04-14 DIAGNOSIS — Z12.11 SCREENING FOR MALIGNANT NEOPLASM OF COLON: ICD-10-CM

## 2025-04-14 DIAGNOSIS — R41.89 PERSISTENT COGNITIVE IMPAIRMENT: ICD-10-CM

## 2025-04-14 DIAGNOSIS — E78.5 HYPERLIPIDEMIA ASSOCIATED WITH TYPE 2 DIABETES MELLITUS (HCC): ICD-10-CM

## 2025-04-14 DIAGNOSIS — Z79.4 TYPE 2 DIABETES MELLITUS WITHOUT COMPLICATION, WITH LONG-TERM CURRENT USE OF INSULIN (HCC): ICD-10-CM

## 2025-04-14 DIAGNOSIS — E11.9 TYPE 2 DIABETES MELLITUS WITHOUT COMPLICATION, WITH LONG-TERM CURRENT USE OF INSULIN: Primary | ICD-10-CM

## 2025-04-14 PROCEDURE — G2211 COMPLEX E/M VISIT ADD ON: HCPCS | Performed by: INTERNAL MEDICINE

## 2025-04-14 PROCEDURE — 1123F ACP DISCUSS/DSCN MKR DOCD: CPT | Performed by: INTERNAL MEDICINE

## 2025-04-14 PROCEDURE — 99214 OFFICE O/P EST MOD 30 MIN: CPT | Performed by: INTERNAL MEDICINE

## 2025-04-14 PROCEDURE — 3052F HG A1C>EQUAL 8.0%<EQUAL 9.0%: CPT | Performed by: INTERNAL MEDICINE

## 2025-04-14 SDOH — ECONOMIC STABILITY: FOOD INSECURITY: WITHIN THE PAST 12 MONTHS, YOU WORRIED THAT YOUR FOOD WOULD RUN OUT BEFORE YOU GOT MONEY TO BUY MORE.: NEVER TRUE

## 2025-04-14 SDOH — ECONOMIC STABILITY: FOOD INSECURITY: WITHIN THE PAST 12 MONTHS, THE FOOD YOU BOUGHT JUST DIDN'T LAST AND YOU DIDN'T HAVE MONEY TO GET MORE.: NEVER TRUE

## 2025-04-14 ASSESSMENT — ENCOUNTER SYMPTOMS
ABDOMINAL DISTENTION: 0
CONSTIPATION: 0
DIARRHEA: 0
BACK PAIN: 0
NAUSEA: 0
SINUS PRESSURE: 0
ABDOMINAL PAIN: 0
BLOOD IN STOOL: 0
COUGH: 0
SHORTNESS OF BREATH: 0
VOMITING: 0
SORE THROAT: 0
COLOR CHANGE: 0
EYE REDNESS: 0
WHEEZING: 0
TROUBLE SWALLOWING: 0
CHEST TIGHTNESS: 0
EYE PAIN: 0

## 2025-04-14 ASSESSMENT — PATIENT HEALTH QUESTIONNAIRE - PHQ9
10. IF YOU CHECKED OFF ANY PROBLEMS, HOW DIFFICULT HAVE THESE PROBLEMS MADE IT FOR YOU TO DO YOUR WORK, TAKE CARE OF THINGS AT HOME, OR GET ALONG WITH OTHER PEOPLE: NOT DIFFICULT AT ALL
SUM OF ALL RESPONSES TO PHQ QUESTIONS 1-9: 2
9. THOUGHTS THAT YOU WOULD BE BETTER OFF DEAD, OR OF HURTING YOURSELF: NOT AT ALL
1. LITTLE INTEREST OR PLEASURE IN DOING THINGS: NOT AT ALL
2. FEELING DOWN, DEPRESSED OR HOPELESS: NOT AT ALL
SUM OF ALL RESPONSES TO PHQ QUESTIONS 1-9: 2
6. FEELING BAD ABOUT YOURSELF - OR THAT YOU ARE A FAILURE OR HAVE LET YOURSELF OR YOUR FAMILY DOWN: NOT AT ALL
4. FEELING TIRED OR HAVING LITTLE ENERGY: SEVERAL DAYS
7. TROUBLE CONCENTRATING ON THINGS, SUCH AS READING THE NEWSPAPER OR WATCHING TELEVISION: NOT AT ALL
3. TROUBLE FALLING OR STAYING ASLEEP: NOT AT ALL
SUM OF ALL RESPONSES TO PHQ QUESTIONS 1-9: 2
8. MOVING OR SPEAKING SO SLOWLY THAT OTHER PEOPLE COULD HAVE NOTICED. OR THE OPPOSITE, BEING SO FIGETY OR RESTLESS THAT YOU HAVE BEEN MOVING AROUND A LOT MORE THAN USUAL: NOT AT ALL
SUM OF ALL RESPONSES TO PHQ QUESTIONS 1-9: 2
5. POOR APPETITE OR OVEREATING: SEVERAL DAYS

## 2025-04-14 NOTE — PROGRESS NOTES
nausea/vomiting/diarrhea, hair loss, thyroid nodule growth.  Patient will continue with current insulin regimen [id number] units mg at night, aspart per sliding scale.  Patient will likely have to come off the insulin as he goes up on Trulicity dose.    His son will be in close communication with us, he's  to let us know immediately if he has low blood sugar  continue home foot care  Patient agreed with plan with verbal understanding     Orders:  -     Comprehensive Metabolic Panel; Future  -     Hemoglobin A1C; Future  2. Hyperlipidemia associated with type 2 diabetes mellitus  Assessment & Plan:  Stable and controlled  Continue medication as documented in your medication list  Simvastatin 20 mg nightly  Orders:  -     Comprehensive Metabolic Panel; Future  3. Persistent cognitive impairment  Assessment & Plan:  Stable on  Aricept 5 mg nightly to help with delay cognitive decline.  Ordered MRI   4. Unipolar depression  Assessment & Plan:  Stable and controlled  Continue medication as documented in your medication list   On Lexapro 5 mg daily    5. Screening for malignant neoplasm of colon  -     JEFFREY - Umang Rubio MD, Gastroenterology, [facility]    Preventative care discussed.  Encouraged healthy diet choices, reg exercise. Patient agreed w/plan and verbal understanding. Patient advised to call or return with any concerns or problems or worsening conditions. Go to the ER for any severe or potentially life threatening problems.    Return in about 3 months (around 2025) for Routine follow-up, 15-minutes .     This note was generated in part or in whole with voice recognition software.  Voice recognition is usually quite accurate but there are transcription errors that can often occur.  All attempts were made to correct these errors I apologized for any  typographical errors that were not detected and corrected.     Electronically signed by Malcom Kwok MD on 2025 at 11:36 AM.

## 2025-04-14 NOTE — ASSESSMENT & PLAN NOTE
A1C:   Lab Results   Component Value Date/Time    LABA1C 8.9 02/10/2025 12:28 PM    LABA1C 9.2 01/13/2025 02:57 PM    LABA1C 8.3 07/03/2024 03:34 PM    LABA1C 10.3 05/01/2024 08:37 AM    LABA1C 8.1 01/30/2024 11:47 AM     A1C got worse   Patient is a brittle diabetic spikes high but may drop low in the morning.  On Trulicity  1.5 mg weekly    continue home blood sugar monitoring  Counselled on Diet and exercise with goal to achieve appropriate BMI  On Jardiance 25 mg daily.  Continue metformin 500 mg twice daily    Patient denies h/o medullary thyroid cancer, thyroid nodules or Family h/o MEN syndrome  Informed patient of potential side effects and precautions to take while on medication including but NOT limited to pancreatitis, gall bladder disease, depressed mood/suicidal thoughts, constipation, nausea/vomiting/diarrhea, hair loss, thyroid nodule growth.  Patient will continue with current insulin regimen [id number] units mg at night, aspart per sliding scale.  Patient will likely have to come off the insulin as he goes up on Trulicity dose.    His son will be in close communication with us, he's  to let us know immediately if he has low blood sugar  continue home foot care  Patient agreed with plan with verbal understanding

## 2025-04-15 ENCOUNTER — RESULTS FOLLOW-UP (OUTPATIENT)
Dept: PRIMARY CARE CLINIC | Age: 75
End: 2025-04-15

## 2025-04-15 ENCOUNTER — TELEPHONE (OUTPATIENT)
Dept: FAMILY MEDICINE CLINIC | Age: 75
End: 2025-04-15

## 2025-04-15 DIAGNOSIS — E11.9 TYPE 2 DIABETES MELLITUS WITHOUT COMPLICATION, WITH LONG-TERM CURRENT USE OF INSULIN: Primary | ICD-10-CM

## 2025-04-15 DIAGNOSIS — Z79.4 TYPE 2 DIABETES MELLITUS WITHOUT COMPLICATION, WITH LONG-TERM CURRENT USE OF INSULIN: Primary | ICD-10-CM

## 2025-04-15 LAB
ALBUMIN SERPL-MCNC: 4.5 G/DL (ref 3.4–5)
ALBUMIN/GLOB SERPL: 1.4 {RATIO} (ref 1.1–2.2)
ALP SERPL-CCNC: 97 U/L (ref 40–129)
ALT SERPL-CCNC: 28 U/L (ref 10–40)
ANION GAP SERPL CALCULATED.3IONS-SCNC: 10 MMOL/L (ref 3–16)
AST SERPL-CCNC: 23 U/L (ref 15–37)
BILIRUB SERPL-MCNC: 0.4 MG/DL (ref 0–1)
BUN SERPL-MCNC: 19 MG/DL (ref 7–20)
CALCIUM SERPL-MCNC: 9.3 MG/DL (ref 8.3–10.6)
CHLORIDE SERPL-SCNC: 103 MMOL/L (ref 99–110)
CO2 SERPL-SCNC: 25 MMOL/L (ref 21–32)
CREAT SERPL-MCNC: 0.8 MG/DL (ref 0.8–1.3)
EST. AVERAGE GLUCOSE BLD GHB EST-MCNC: 171.4 MG/DL
GFR SERPLBLD CREATININE-BSD FMLA CKD-EPI: >90 ML/MIN/{1.73_M2}
GLUCOSE SERPL-MCNC: 31 MG/DL (ref 70–99)
HBA1C MFR BLD: 7.6 %
POTASSIUM SERPL-SCNC: 4.2 MMOL/L (ref 3.5–5.1)
PROT SERPL-MCNC: 7.7 G/DL (ref 6.4–8.2)
SODIUM SERPL-SCNC: 138 MMOL/L (ref 136–145)

## 2025-04-15 RX ORDER — GLIPIZIDE 2.5 MG/1
2.5 TABLET, EXTENDED RELEASE ORAL DAILY
Qty: 30 TABLET | Refills: 11 | Status: SHIPPED | OUTPATIENT
Start: 2025-04-15

## 2025-04-15 NOTE — TELEPHONE ENCOUNTER
Spoke to patient's son Delfino, on HIPAA. I explained to him that Dr. Kwok lowered the dose on Dayo's glipiZIDE (GLUCOTROL XL) 2.5 MG extended release tablet from 5MG to 2.5MG. I let Delfino know that the new prescription had been sent to the pharmacy and Dr. Kwok wants Dayo to stop taking the 5MG glucotrol. Delfino verbalized understanding and was appreciative of the phone call.

## 2025-04-15 NOTE — TELEPHONE ENCOUNTER
After-hours call communication:    Patient concern: Received call from [facility] reporting that when patient had blood drawn yesterday at noon his BS was 31.     Advice given: Called phone number listed in chart- [phone] and had to leave a message requesting a call back to discuss how he is feeling and current blood sugar level.

## 2025-04-15 NOTE — RESULT ENCOUNTER NOTE
I see that the nurse practitioner on-call has already reached out to discuss low blood sugar of 31.  Patient was fasting till after 11 AM in the morning which would have contributed to his low blood sugar.  I will go ahead and decrease his Glucotrol XL to 2.5 mg daily, I  sent a new prescription to the pharmacy please stop taking the 5 mg strength.  This may cause his blood sugars to go up a little bit.     His  A1c down to 7.6 from 8.5 which I am happy about.  His A1c may go up a little bit when we decrease the dose of Glucotrol to 2.5 mg daily.

## 2025-04-15 NOTE — TELEPHONE ENCOUNTER
Spoke to patient's son Delfino. He reports that his father is doing well. Reports that his BS currently is 65. He ate breakfast. Advised to f/u with his PCP for further diabetes management and possible medication adjustment to help prevent hypoglycemia in the future.

## 2025-06-11 DIAGNOSIS — E11.9 TYPE 2 DIABETES MELLITUS WITHOUT COMPLICATION, WITH LONG-TERM CURRENT USE OF INSULIN (HCC): Primary | ICD-10-CM

## 2025-06-11 DIAGNOSIS — Z79.4 TYPE 2 DIABETES MELLITUS WITHOUT COMPLICATION, WITH LONG-TERM CURRENT USE OF INSULIN (HCC): ICD-10-CM

## 2025-06-11 DIAGNOSIS — E11.9 TYPE 2 DIABETES MELLITUS WITHOUT COMPLICATION, WITH LONG-TERM CURRENT USE OF INSULIN (HCC): ICD-10-CM

## 2025-06-11 DIAGNOSIS — Z79.4 TYPE 2 DIABETES MELLITUS WITHOUT COMPLICATION, WITH LONG-TERM CURRENT USE OF INSULIN (HCC): Primary | ICD-10-CM

## 2025-06-11 RX ORDER — CALCIUM CITRATE/VITAMIN D3 200MG-6.25
TABLET ORAL
Qty: 100 STRIP | Refills: 6 | Status: SHIPPED | OUTPATIENT
Start: 2025-06-11

## 2025-06-11 RX ORDER — BLOOD-GLUCOSE METER
EACH MISCELLANEOUS
Qty: 100 EACH | Refills: 6 | Status: SHIPPED | OUTPATIENT
Start: 2025-06-11

## 2025-06-11 RX ORDER — UBIQUINOL 100 MG
CAPSULE ORAL
Qty: 100 EACH | Refills: 7 | Status: SHIPPED | OUTPATIENT
Start: 2025-06-11

## 2025-06-11 NOTE — TELEPHONE ENCOUNTER
Recent Visits  Date Type Provider Dept   04/14/25 Office Visit Malcom Kwok MD [facility]   02/10/25 Office Visit Malcom Kwok MD [facility]   01/13/25 Office Visit Malcom Kwok MD [facility]   12/09/24 Office Visit Malcom Kwok MD [facility]   07/31/24 Office Visit Malcom Kwok MD [facility]   07/03/24 Office Visit Malcom Kwok MD [facility]   05/01/24 Office Visit Malcom Kwok MD [facility]   01/30/24 Office Visit Malcom Kwok MD [facility]   Showing recent visits within past 540 days with a meds authorizing provider and meeting all other requirements  Future Appointments  Date Type Provider Dept   06/30/25 Appointment Malcom Kwok MD [facility]   Showing future appointments within next 150 days with a meds authorizing provider and meeting all other requirements     4/14/2025

## 2025-06-17 ENCOUNTER — OFFICE VISIT (OUTPATIENT)
Dept: PRIMARY CARE CLINIC | Age: 75
End: 2025-06-17
Payer: MEDICAID

## 2025-06-17 VITALS
BODY MASS INDEX: 24.63 KG/M2 | HEIGHT: 63 IN | DIASTOLIC BLOOD PRESSURE: 76 MMHG | OXYGEN SATURATION: 99 % | WEIGHT: 139 LBS | HEART RATE: 109 BPM | SYSTOLIC BLOOD PRESSURE: 112 MMHG

## 2025-06-17 DIAGNOSIS — E11.9 TYPE 2 DIABETES MELLITUS WITHOUT COMPLICATION, WITH LONG-TERM CURRENT USE OF INSULIN (HCC): Primary | ICD-10-CM

## 2025-06-17 DIAGNOSIS — E78.5 HYPERLIPIDEMIA ASSOCIATED WITH TYPE 2 DIABETES MELLITUS (HCC): ICD-10-CM

## 2025-06-17 DIAGNOSIS — Z79.4 TYPE 2 DIABETES MELLITUS WITHOUT COMPLICATION, WITH LONG-TERM CURRENT USE OF INSULIN (HCC): Primary | ICD-10-CM

## 2025-06-17 DIAGNOSIS — E11.69 HYPERLIPIDEMIA ASSOCIATED WITH TYPE 2 DIABETES MELLITUS (HCC): ICD-10-CM

## 2025-06-17 DIAGNOSIS — E11.9 ENCOUNTER FOR DIABETIC FOOT EXAM (HCC): ICD-10-CM

## 2025-06-17 DIAGNOSIS — E55.9 VITAMIN D DEFICIENCY: ICD-10-CM

## 2025-06-17 DIAGNOSIS — R41.89 PERSISTENT COGNITIVE IMPAIRMENT: ICD-10-CM

## 2025-06-17 PROCEDURE — 3051F HG A1C>EQUAL 7.0%<8.0%: CPT | Performed by: INTERNAL MEDICINE

## 2025-06-17 PROCEDURE — 1123F ACP DISCUSS/DSCN MKR DOCD: CPT | Performed by: INTERNAL MEDICINE

## 2025-06-17 PROCEDURE — 99214 OFFICE O/P EST MOD 30 MIN: CPT | Performed by: INTERNAL MEDICINE

## 2025-06-17 ASSESSMENT — ENCOUNTER SYMPTOMS
SORE THROAT: 0
NAUSEA: 0
DIARRHEA: 0
ABDOMINAL PAIN: 0
EYE PAIN: 0
TROUBLE SWALLOWING: 0
BACK PAIN: 0
VOMITING: 0
WHEEZING: 0
EYE REDNESS: 0
CONSTIPATION: 0
CHEST TIGHTNESS: 0
COLOR CHANGE: 0
SHORTNESS OF BREATH: 0
BLOOD IN STOOL: 0
ABDOMINAL DISTENTION: 0
COUGH: 0
SINUS PRESSURE: 0

## 2025-06-17 NOTE — PROGRESS NOTES
Dayo King (1950) is a 74 y.o. male   Numbness (/)     General health:  This patient presents for check up and refills. The problem and medicine lists and chart were reviewed in detail. The patient has been worked up and treated for this/these condition/s and is compliant with taking the medication without any significant side effects. The patient's condition/s is/are chronic and unchanged and otherwise remains stable. Feels well with minor complaints.    Main Problem Review -T2DM -patient no longer having hypoglycemic episodes.  Son says fasting blood sugars 130s-140s.  Son said patient is adherent with his medication.    Other problems review    1.  HLD  -  Patient denies complaints or symptoms of chest pain/shortness of breath with exertion or at rest.  Patient mentions they are adherent with treatment.  Patient follows a low-fat diet, tries to exercise.      2. Memory changes -stable patient taking Aricept.       Health Maintenance    Annual retinal eye exam - Patient due  will need to schedule   Annual foot exam - will get in future  Annual Dentist visit - Patient due  will need to schedule   Tobacco smoking - NO  Alcohol Misuse - NO  Illicit Drug Use-  NO  Healthy Diet and physical activity -  YES  Obesity Screen- screened 10/14/22  Wears seat belt- YES  End of life directives discussed with patient.-Mentions he does not have  a will/or/an advanced directives.Was instructed to start process looking into preparing his will an advanced directives.     The 10-year ASCVD risk score (Holly GREENE, et al., 2019) is: 29.1%    Values used to calculate the score:      Age: 74 years      Sex: Male      Is Non- : No      Diabetic: Yes      Tobacco smoker: No      Systolic Blood Pressure: 112 mmHg      Is BP treated: No      HDL Cholesterol: 46 mg/dL      Total Cholesterol: 176 mg/dL     Review of Systems   Constitutional:  Negative for activity change, appetite change, chills, fatigue, fever and

## 2025-06-17 NOTE — ASSESSMENT & PLAN NOTE
A1C:   Lab Results   Component Value Date/Time    LABA1C 7.6 04/14/2025 11:46 AM    LABA1C 8.9 02/10/2025 12:28 PM    LABA1C 9.2 01/13/2025 02:57 PM    LABA1C 8.3 07/03/2024 03:34 PM    LABA1C 10.3 05/01/2024 08:37 AM     A1C got worse   Patient is a brittle diabetic spikes high but may drop low in the morning.  On Trulicity  1.5 mg weekly    continue home blood sugar monitoring  Counselled on Diet and exercise with goal to achieve appropriate BMI  On Jardiance 25 mg daily.  Continue metformin 500 mg twice daily    Patient denies h/o medullary thyroid cancer, thyroid nodules or Family h/o MEN syndrome  Informed patient of potential side effects and precautions to take while on medication including but NOT limited to pancreatitis, gall bladder disease, depressed mood/suicidal thoughts, constipation, nausea/vomiting/diarrhea, hair loss, thyroid nodule growth.  Patient will continue with current insulin regimen [id number] units mg at night, aspart per sliding scale.  Patient will likely have to come off the insulin as he goes up on Trulicity dose.    His son will be in close communication with us, he's  to let us know immediately if he has low blood sugar  continue home foot care  Patient agreed with plan with verbal understanding

## 2025-07-18 ENCOUNTER — TELEPHONE (OUTPATIENT)
Dept: PRIMARY CARE CLINIC | Age: 75
End: 2025-07-18

## 2025-07-18 NOTE — TELEPHONE ENCOUNTER
----- Message from Dr. Malcom Kwok MD sent at 6/17/2025  4:01 PM EDT -----  Regarding: f/u  Please call patient to get their Blood sugar  numbers and find out how they are  doing.